# Patient Record
Sex: MALE | Race: BLACK OR AFRICAN AMERICAN | NOT HISPANIC OR LATINO
[De-identification: names, ages, dates, MRNs, and addresses within clinical notes are randomized per-mention and may not be internally consistent; named-entity substitution may affect disease eponyms.]

---

## 2017-01-27 ENCOUNTER — APPOINTMENT (OUTPATIENT)
Dept: INTERNAL MEDICINE | Facility: CLINIC | Age: 62
End: 2017-01-27

## 2017-01-27 VITALS — OXYGEN SATURATION: 98 % | TEMPERATURE: 97.3 F | WEIGHT: 146 LBS | BODY MASS INDEX: 23.57 KG/M2 | HEART RATE: 68 BPM

## 2017-02-12 ENCOUNTER — FORM ENCOUNTER (OUTPATIENT)
Age: 62
End: 2017-02-12

## 2017-02-13 ENCOUNTER — OUTPATIENT (OUTPATIENT)
Dept: OUTPATIENT SERVICES | Facility: HOSPITAL | Age: 62
LOS: 1 days | End: 2017-02-13
Payer: COMMERCIAL

## 2017-02-13 ENCOUNTER — APPOINTMENT (OUTPATIENT)
Dept: ORTHOPEDIC SURGERY | Facility: CLINIC | Age: 62
End: 2017-02-13

## 2017-02-13 VITALS
DIASTOLIC BLOOD PRESSURE: 70 MMHG | SYSTOLIC BLOOD PRESSURE: 118 MMHG | WEIGHT: 150 LBS | BODY MASS INDEX: 24.11 KG/M2 | HEIGHT: 66 IN

## 2017-02-13 PROCEDURE — 73564 X-RAY EXAM KNEE 4 OR MORE: CPT | Mod: 26,50

## 2017-02-13 PROCEDURE — 72170 X-RAY EXAM OF PELVIS: CPT

## 2017-02-13 PROCEDURE — 73564 X-RAY EXAM KNEE 4 OR MORE: CPT

## 2017-02-13 PROCEDURE — 72170 X-RAY EXAM OF PELVIS: CPT | Mod: 26

## 2017-06-26 ENCOUNTER — MEDICATION RENEWAL (OUTPATIENT)
Age: 62
End: 2017-06-26

## 2017-09-11 ENCOUNTER — MEDICATION RENEWAL (OUTPATIENT)
Age: 62
End: 2017-09-11

## 2017-11-09 ENCOUNTER — RX RENEWAL (OUTPATIENT)
Age: 62
End: 2017-11-09

## 2017-11-17 ENCOUNTER — APPOINTMENT (OUTPATIENT)
Dept: INTERNAL MEDICINE | Facility: CLINIC | Age: 62
End: 2017-11-17
Payer: COMMERCIAL

## 2017-11-17 VITALS
SYSTOLIC BLOOD PRESSURE: 104 MMHG | BODY MASS INDEX: 24.47 KG/M2 | OXYGEN SATURATION: 98 % | HEIGHT: 66 IN | WEIGHT: 152.25 LBS | DIASTOLIC BLOOD PRESSURE: 70 MMHG | HEART RATE: 72 BPM | TEMPERATURE: 97.7 F

## 2017-11-17 DIAGNOSIS — Z78.9 OTHER SPECIFIED HEALTH STATUS: ICD-10-CM

## 2017-11-17 DIAGNOSIS — Z87.448 PERSONAL HISTORY OF OTHER DISEASES OF URINARY SYSTEM: ICD-10-CM

## 2017-11-17 DIAGNOSIS — Z80.3 FAMILY HISTORY OF MALIGNANT NEOPLASM OF BREAST: ICD-10-CM

## 2017-11-17 DIAGNOSIS — M79.603 PAIN IN ARM, UNSPECIFIED: ICD-10-CM

## 2017-11-17 PROCEDURE — 99396 PREV VISIT EST AGE 40-64: CPT | Mod: 25

## 2017-11-17 PROCEDURE — 36415 COLL VENOUS BLD VENIPUNCTURE: CPT

## 2017-11-17 RX ORDER — IBUPROFEN 600 MG/1
600 TABLET, FILM COATED ORAL
Qty: 20 | Refills: 0 | Status: DISCONTINUED | COMMUNITY
Start: 2017-06-30

## 2017-11-17 RX ORDER — AMOXICILLIN 500 MG/1
500 CAPSULE ORAL
Qty: 21 | Refills: 0 | Status: DISCONTINUED | COMMUNITY
Start: 2017-06-30

## 2017-11-17 RX ORDER — SIMVASTATIN 10 MG/1
10 TABLET, FILM COATED ORAL
Qty: 90 | Refills: 0 | Status: DISCONTINUED | COMMUNITY
Start: 2017-06-26

## 2017-12-01 LAB
ALBUMIN SERPL ELPH-MCNC: 4.1 G/DL
ALP BLD-CCNC: 65 U/L
ALT SERPL-CCNC: 23 U/L
ANION GAP SERPL CALC-SCNC: 14 MMOL/L
AST SERPL-CCNC: 19 U/L
BASOPHILS # BLD AUTO: 0.03 K/UL
BASOPHILS NFR BLD AUTO: 0.4 %
BILIRUB SERPL-MCNC: 0.7 MG/DL
BUN SERPL-MCNC: 17 MG/DL
CALCIUM SERPL-MCNC: 9.6 MG/DL
CHLORIDE SERPL-SCNC: 103 MMOL/L
CHOLEST SERPL-MCNC: 192 MG/DL
CHOLEST/HDLC SERPL: 2.3 RATIO
CO2 SERPL-SCNC: 23 MMOL/L
CREAT SERPL-MCNC: 1.14 MG/DL
EOSINOPHIL # BLD AUTO: 0.21 K/UL
EOSINOPHIL NFR BLD AUTO: 2.9 %
GLUCOSE SERPL-MCNC: 91 MG/DL
HCT VFR BLD CALC: 42.8 %
HDLC SERPL-MCNC: 83 MG/DL
HGB BLD-MCNC: 14.6 G/DL
IMM GRANULOCYTES NFR BLD AUTO: 0.4 %
LDLC SERPL CALC-MCNC: 99 MG/DL
LYMPHOCYTES # BLD AUTO: 1.7 K/UL
LYMPHOCYTES NFR BLD AUTO: 23.4 %
MAN DIFF?: NORMAL
MCHC RBC-ENTMCNC: 32.3 PG
MCHC RBC-ENTMCNC: 34.1 GM/DL
MCV RBC AUTO: 94.7 FL
MONOCYTES # BLD AUTO: 0.55 K/UL
MONOCYTES NFR BLD AUTO: 7.6 %
NEUTROPHILS # BLD AUTO: 4.75 K/UL
NEUTROPHILS NFR BLD AUTO: 65.3 %
PLATELET # BLD AUTO: 277 K/UL
POTASSIUM SERPL-SCNC: 4.4 MMOL/L
PROT SERPL-MCNC: 7.1 G/DL
PSA SERPL-MCNC: 0.05 NG/ML
RBC # BLD: 4.52 M/UL
RBC # FLD: 14.9 %
SODIUM SERPL-SCNC: 140 MMOL/L
TRIGL SERPL-MCNC: 49 MG/DL
WBC # FLD AUTO: 7.27 K/UL

## 2018-01-17 ENCOUNTER — MEDICATION RENEWAL (OUTPATIENT)
Age: 63
End: 2018-01-17

## 2018-02-06 ENCOUNTER — APPOINTMENT (OUTPATIENT)
Dept: GASTROENTEROLOGY | Facility: CLINIC | Age: 63
End: 2018-02-06
Payer: COMMERCIAL

## 2018-02-06 VITALS
WEIGHT: 153 LBS | TEMPERATURE: 98.3 F | OXYGEN SATURATION: 97 % | HEART RATE: 60 BPM | SYSTOLIC BLOOD PRESSURE: 105 MMHG | DIASTOLIC BLOOD PRESSURE: 80 MMHG | RESPIRATION RATE: 14 BRPM | BODY MASS INDEX: 24.7 KG/M2

## 2018-02-06 DIAGNOSIS — Z12.11 ENCOUNTER FOR SCREENING FOR MALIGNANT NEOPLASM OF COLON: ICD-10-CM

## 2018-02-06 PROCEDURE — 99203 OFFICE O/P NEW LOW 30 MIN: CPT

## 2018-03-23 ENCOUNTER — APPOINTMENT (OUTPATIENT)
Dept: GASTROENTEROLOGY | Facility: CLINIC | Age: 63
End: 2018-03-23
Payer: COMMERCIAL

## 2018-03-23 PROCEDURE — 45378 DIAGNOSTIC COLONOSCOPY: CPT | Mod: 52

## 2018-04-12 ENCOUNTER — FORM ENCOUNTER (OUTPATIENT)
Age: 63
End: 2018-04-12

## 2018-04-13 ENCOUNTER — OUTPATIENT (OUTPATIENT)
Dept: OUTPATIENT SERVICES | Facility: HOSPITAL | Age: 63
LOS: 1 days | End: 2018-04-13
Payer: COMMERCIAL

## 2018-04-13 ENCOUNTER — APPOINTMENT (OUTPATIENT)
Dept: INTERNAL MEDICINE | Facility: CLINIC | Age: 63
End: 2018-04-13
Payer: COMMERCIAL

## 2018-04-13 VITALS
BODY MASS INDEX: 24.11 KG/M2 | WEIGHT: 150 LBS | OXYGEN SATURATION: 97 % | TEMPERATURE: 97.5 F | HEIGHT: 66 IN | HEART RATE: 69 BPM | DIASTOLIC BLOOD PRESSURE: 71 MMHG | SYSTOLIC BLOOD PRESSURE: 122 MMHG

## 2018-04-13 PROCEDURE — 73120 X-RAY EXAM OF HAND: CPT | Mod: 26,RT

## 2018-04-13 PROCEDURE — 73120 X-RAY EXAM OF HAND: CPT

## 2018-04-13 PROCEDURE — 99214 OFFICE O/P EST MOD 30 MIN: CPT

## 2018-04-27 ENCOUNTER — MOBILE ON CALL (OUTPATIENT)
Age: 63
End: 2018-04-27

## 2018-05-11 ENCOUNTER — APPOINTMENT (OUTPATIENT)
Dept: INTERNAL MEDICINE | Facility: CLINIC | Age: 63
End: 2018-05-11
Payer: COMMERCIAL

## 2018-05-11 VITALS
DIASTOLIC BLOOD PRESSURE: 70 MMHG | SYSTOLIC BLOOD PRESSURE: 119 MMHG | BODY MASS INDEX: 24.27 KG/M2 | OXYGEN SATURATION: 98 % | HEIGHT: 66 IN | TEMPERATURE: 97.6 F | HEART RATE: 58 BPM | WEIGHT: 151 LBS

## 2018-05-11 PROCEDURE — 99213 OFFICE O/P EST LOW 20 MIN: CPT

## 2018-05-31 ENCOUNTER — RX RENEWAL (OUTPATIENT)
Age: 63
End: 2018-05-31

## 2018-08-17 ENCOUNTER — APPOINTMENT (OUTPATIENT)
Dept: INTERNAL MEDICINE | Facility: CLINIC | Age: 63
End: 2018-08-17
Payer: COMMERCIAL

## 2018-08-17 VITALS
SYSTOLIC BLOOD PRESSURE: 126 MMHG | HEART RATE: 73 BPM | HEIGHT: 66 IN | OXYGEN SATURATION: 97 % | TEMPERATURE: 97.9 F | BODY MASS INDEX: 23.3 KG/M2 | DIASTOLIC BLOOD PRESSURE: 68 MMHG | WEIGHT: 145 LBS

## 2018-08-17 PROCEDURE — 99214 OFFICE O/P EST MOD 30 MIN: CPT

## 2018-08-17 NOTE — ASSESSMENT
[FreeTextEntry1] : Patient is a 63-year-old man with AVM status post intracranial bleed, status post coiling complicated by DVT, status post Coumadin, prostate cancer, hyperlipidemia, osteoarthritis, who presents for implant of right hip pain and fatigue for the past few months.\par \par #1 right hip pain.\par Mild tendinitis ice and rest nonsteroidals.\par Range of motion exercises observe.\par \par #2 chronic fatigue.\par Check TFTs, LFTs, CBC\par Discuss these hygiene.\par Observe consider sleep study in the future.\par \par #3 hyperlipidemic\par  simvastatin 40 mg once

## 2018-08-17 NOTE — HISTORY OF PRESENT ILLNESS
[FreeTextEntry1] : Complaint of right hip pain and fatigue [de-identified] : \par Patient is a 63-year-old man with history of AVM status post coiling complicated by DVT, status post Coumadin, history of prostate cancer, lipoma, osteoarthritis, who presents for complaint of right hip pain for the past few weeks, mainly in the morning, getting up seems to improve throughout the day. Denies redness, swelling, also complains of chronic fatigue at work fell asleep several times. Doesn't know if she snores however goes to sleep between 12:30 1:00 wakes up 6:30 on refreshed. He denies chest pain, shortness of breath, lightheadedness, dizziness\par

## 2018-08-17 NOTE — PHYSICAL EXAM
[No Acute Distress] : no acute distress [Well Nourished] : well nourished [Well Developed] : well developed [Normal Voice/Communication] : normal voice/communication [No Respiratory Distress] : no respiratory distress  [Clear to Auscultation] : lungs were clear to auscultation bilaterally [No Accessory Muscle Use] : no accessory muscle use [Normal Rate] : normal rate  [Regular Rhythm] : with a regular rhythm [Normal S1, S2] : normal S1 and S2 [Soft] : abdomen soft [Non Tender] : non-tender [Non-distended] : non-distended [No HSM] : no HSM [Normal Bowel Sounds] : normal bowel sounds [Normal Supraclavicular Nodes] : no supraclavicular lymphadenopathy [Normal Axillary Nodes] : no axillary lymphadenopathy [Normal Anterior Cervical Nodes] : no anterior cervical lymphadenopathy [No Joint Swelling] : no joint swelling [Grossly Normal Strength/Tone] : grossly normal strength/tone [de-identified] : right Hip full range of motion, negative erythema negative clinic

## 2018-08-23 ENCOUNTER — MOBILE ON CALL (OUTPATIENT)
Age: 63
End: 2018-08-23

## 2018-08-31 LAB
ALT SERPL-CCNC: 25 U/L
ANION GAP SERPL CALC-SCNC: 12 MMOL/L
BASOPHILS # BLD AUTO: 0.02 K/UL
BASOPHILS NFR BLD AUTO: 0.3 %
BUN SERPL-MCNC: 16 MG/DL
CALCIUM SERPL-MCNC: 9.9 MG/DL
CHLORIDE SERPL-SCNC: 103 MMOL/L
CO2 SERPL-SCNC: 25 MMOL/L
CREAT SERPL-MCNC: 0.89 MG/DL
EOSINOPHIL # BLD AUTO: 0.16 K/UL
EOSINOPHIL NFR BLD AUTO: 2.2 %
GLUCOSE SERPL-MCNC: 91 MG/DL
HCT VFR BLD CALC: 43.5 %
HGB BLD-MCNC: 14.7 G/DL
IMM GRANULOCYTES NFR BLD AUTO: 0.3 %
LYMPHOCYTES # BLD AUTO: 1.44 K/UL
LYMPHOCYTES NFR BLD AUTO: 19.5 %
MAN DIFF?: NORMAL
MCHC RBC-ENTMCNC: 32 PG
MCHC RBC-ENTMCNC: 33.8 GM/DL
MCV RBC AUTO: 94.6 FL
MONOCYTES # BLD AUTO: 0.57 K/UL
MONOCYTES NFR BLD AUTO: 7.7 %
NEUTROPHILS # BLD AUTO: 5.17 K/UL
NEUTROPHILS NFR BLD AUTO: 70 %
PLATELET # BLD AUTO: 283 K/UL
POTASSIUM SERPL-SCNC: 4.4 MMOL/L
RBC # BLD: 4.6 M/UL
RBC # FLD: 14.3 %
SODIUM SERPL-SCNC: 140 MMOL/L
TSH SERPL-ACNC: 1.02 UIU/ML
WBC # FLD AUTO: 7.38 K/UL

## 2018-11-13 ENCOUNTER — RX RENEWAL (OUTPATIENT)
Age: 63
End: 2018-11-13

## 2019-01-18 ENCOUNTER — APPOINTMENT (OUTPATIENT)
Dept: INTERNAL MEDICINE | Facility: CLINIC | Age: 64
End: 2019-01-18
Payer: COMMERCIAL

## 2019-01-18 VITALS
OXYGEN SATURATION: 98 % | WEIGHT: 144 LBS | TEMPERATURE: 97.3 F | HEART RATE: 75 BPM | HEIGHT: 66 IN | SYSTOLIC BLOOD PRESSURE: 114 MMHG | DIASTOLIC BLOOD PRESSURE: 72 MMHG | BODY MASS INDEX: 23.14 KG/M2

## 2019-01-18 PROCEDURE — 93000 ELECTROCARDIOGRAM COMPLETE: CPT

## 2019-01-18 PROCEDURE — 99212 OFFICE O/P EST SF 10 MIN: CPT | Mod: 25

## 2019-01-18 PROCEDURE — G0009: CPT

## 2019-01-18 PROCEDURE — 90670 PCV13 VACCINE IM: CPT

## 2019-01-18 PROCEDURE — G0439: CPT

## 2019-01-18 PROCEDURE — 36415 COLL VENOUS BLD VENIPUNCTURE: CPT

## 2019-03-13 ENCOUNTER — APPOINTMENT (OUTPATIENT)
Dept: INTERNAL MEDICINE | Facility: CLINIC | Age: 64
End: 2019-03-13
Payer: COMMERCIAL

## 2019-03-13 PROCEDURE — 36415 COLL VENOUS BLD VENIPUNCTURE: CPT

## 2019-03-15 LAB
ALBUMIN SERPL ELPH-MCNC: 4.1 G/DL
ALP BLD-CCNC: 60 U/L
ALT SERPL-CCNC: 21 U/L
ANION GAP SERPL CALC-SCNC: 12 MMOL/L
AST SERPL-CCNC: 20 U/L
BASOPHILS # BLD AUTO: 0.04 K/UL
BASOPHILS NFR BLD AUTO: 0.7 %
BILIRUB SERPL-MCNC: 0.7 MG/DL
BUN SERPL-MCNC: 15 MG/DL
CALCIUM SERPL-MCNC: 9.9 MG/DL
CHLORIDE SERPL-SCNC: 104 MMOL/L
CHOLEST SERPL-MCNC: 174 MG/DL
CHOLEST/HDLC SERPL: 2.3 RATIO
CO2 SERPL-SCNC: 28 MMOL/L
CREAT SERPL-MCNC: 1.15 MG/DL
EOSINOPHIL # BLD AUTO: 0.16 K/UL
EOSINOPHIL NFR BLD AUTO: 2.6 %
GLUCOSE SERPL-MCNC: 80 MG/DL
HCT VFR BLD CALC: 44.6 %
HDLC SERPL-MCNC: 76 MG/DL
HGB BLD-MCNC: 14.8 G/DL
IMM GRANULOCYTES NFR BLD AUTO: 0.3 %
LDLC SERPL CALC-MCNC: 88 MG/DL
LYMPHOCYTES # BLD AUTO: 1.25 K/UL
LYMPHOCYTES NFR BLD AUTO: 20.4 %
MAN DIFF?: NORMAL
MCHC RBC-ENTMCNC: 31.7 PG
MCHC RBC-ENTMCNC: 33.2 GM/DL
MCV RBC AUTO: 95.5 FL
MONOCYTES # BLD AUTO: 0.56 K/UL
MONOCYTES NFR BLD AUTO: 9.1 %
NEUTROPHILS # BLD AUTO: 4.1 K/UL
NEUTROPHILS NFR BLD AUTO: 66.9 %
PLATELET # BLD AUTO: 263 K/UL
POTASSIUM SERPL-SCNC: 4.4 MMOL/L
PROT SERPL-MCNC: 6.9 G/DL
PSA SERPL-MCNC: 0.04 NG/ML
RBC # BLD: 4.67 M/UL
RBC # FLD: 14.3 %
SODIUM SERPL-SCNC: 144 MMOL/L
TRIGL SERPL-MCNC: 49 MG/DL
WBC # FLD AUTO: 6.13 K/UL

## 2019-06-21 ENCOUNTER — MEDICATION RENEWAL (OUTPATIENT)
Age: 64
End: 2019-06-21

## 2019-06-21 ENCOUNTER — APPOINTMENT (OUTPATIENT)
Dept: INTERNAL MEDICINE | Facility: CLINIC | Age: 64
End: 2019-06-21
Payer: COMMERCIAL

## 2019-06-21 ENCOUNTER — OTHER (OUTPATIENT)
Age: 64
End: 2019-06-21

## 2019-06-21 VITALS
OXYGEN SATURATION: 98 % | HEIGHT: 66 IN | HEART RATE: 71 BPM | TEMPERATURE: 97.4 F | SYSTOLIC BLOOD PRESSURE: 110 MMHG | BODY MASS INDEX: 22.18 KG/M2 | DIASTOLIC BLOOD PRESSURE: 70 MMHG | WEIGHT: 138 LBS

## 2019-06-21 PROCEDURE — 99214 OFFICE O/P EST MOD 30 MIN: CPT | Mod: 25

## 2019-06-21 PROCEDURE — 36415 COLL VENOUS BLD VENIPUNCTURE: CPT

## 2019-06-21 NOTE — PHYSICAL EXAM
[No Acute Distress] : no acute distress [Well Developed] : well developed [Well-Appearing] : well-appearing [Well Nourished] : well nourished [No JVD] : no jugular venous distention [Normal Voice/Communication] : normal voice/communication [No Lymphadenopathy] : no lymphadenopathy [Supple] : supple [Clear to Auscultation] : lungs were clear to auscultation bilaterally [Thyroid Normal, No Nodules] : the thyroid was normal and there were no nodules present [No Respiratory Distress] : no respiratory distress  [No Accessory Muscle Use] : no accessory muscle use [Normal Rate] : normal rate  [Regular Rhythm] : with a regular rhythm [Soft] : abdomen soft [Non Tender] : non-tender [Non-distended] : non-distended [Normal Bowel Sounds] : normal bowel sounds [No HSM] : no HSM

## 2019-06-21 NOTE — HEALTH RISK ASSESSMENT
[Very Good] : ~his/her~  mood as very good [0] : 2) Feeling down, depressed, or hopeless: Not at all (0) [None] : None [Employed] : employed [College] : College [Single] : single [Fully functional (bathing, dressing, toileting, transferring, walking, feeding)] : Fully functional (bathing, dressing, toileting, transferring, walking, feeding) [Fully functional (using the telephone, shopping, preparing meals, housekeeping, doing laundry, using] : Fully functional and needs no help or supervision to perform IADLs (using the telephone, shopping, preparing meals, housekeeping, doing laundry, using transportation, managing medications and managing finances) [Smoke Detector] : smoke detector [Carbon Monoxide Detector] : carbon monoxide detector [Safety elements used in home] : safety elements used in home [Seat Belt] :  uses seat belt [Sunscreen] : uses sunscreen [] : No [Change in mental status noted] : No change in mental status noted [Language] : denies difficulty with language [Behavior] : denies difficulty with behavior [Learning/Retaining New Information] : denies difficulty learning/retaining new information [Handling Complex Tasks] : denies difficulty handling complex tasks [Reasoning] : denies difficulty with reasoning [Spatial Ability and Orientation] : denies difficulty with spatial ability and orientation [Reports changes in hearing] : Reports no changes in hearing [Reports changes in vision] : Reports no changes in vision [Reports changes in dental health] : Reports no changes in dental health [Guns at Home] : no guns at home [Travel to Developing Areas] : does not  travel to developing areas [TB Exposure] : is not being exposed to tuberculosis [Caregiver Concerns] : does not have caregiver concerns [ColonoscopyDate] : 2018

## 2019-06-21 NOTE — PHYSICAL EXAM
[No Acute Distress] : no acute distress [Well Nourished] : well nourished [Well Developed] : well developed [Well-Appearing] : well-appearing [Normal Voice/Communication] : normal voice/communication [Normal Sclera/Conjunctiva] : normal sclera/conjunctiva [PERRL] : pupils equal round and reactive to light [Normal Outer Ear/Nose] : the outer ears and nose were normal in appearance [Normal Oropharynx] : the oropharynx was normal [Normal TMs] : both tympanic membranes were normal [Normal Nasal Mucosa] : the nasal mucosa was normal [No JVD] : no jugular venous distention [Supple] : supple [No Lymphadenopathy] : no lymphadenopathy [Thyroid Normal, No Nodules] : the thyroid was normal and there were no nodules present [No Respiratory Distress] : no respiratory distress  [Clear to Auscultation] : lungs were clear to auscultation bilaterally [No Accessory Muscle Use] : no accessory muscle use [Normal Percussion] : the chest was normal to percussion [Normal Rate] : normal rate  [Regular Rhythm] : with a regular rhythm [Normal S1, S2] : normal S1 and S2 [No Murmur] : no murmur heard [No Carotid Bruits] : no carotid bruits [No Abdominal Bruit] : a ~M bruit was not heard ~T in the abdomen [No Varicosities] : no varicosities [Pedal Pulses Present] : the pedal pulses are present [No Edema] : there was no peripheral edema [No Extremity Clubbing/Cyanosis] : no extremity clubbing/cyanosis [No Palpable Aorta] : no palpable aorta [Normal Appearance] : normal in appearance [No Nipple Discharge] : no nipple discharge [No Axillary Lymphadenopathy] : no axillary lymphadenopathy [Soft] : abdomen soft [Non Tender] : non-tender [Non-distended] : non-distended [No Masses] : no abdominal mass palpated [No HSM] : no HSM [Normal Bowel Sounds] : normal bowel sounds [No Hernias] : no hernias [Normal Sphincter Tone] : normal sphincter tone [No Mass] : no mass [Normal Supraclavicular Nodes] : no supraclavicular lymphadenopathy [Normal Axillary Nodes] : no axillary lymphadenopathy [Normal Posterior Cervical Nodes] : no posterior cervical lymphadenopathy [Normal Femoral Nodes] : no femoral lymphadenopathy [Normal Anterior Cervical Nodes] : no anterior cervical lymphadenopathy [Normal Inguinal Nodes] : no inguinal lymphadenopathy [No CVA Tenderness] : no CVA  tenderness [No Spinal Tenderness] : no spinal tenderness [No Joint Swelling] : no joint swelling [Grossly Normal Strength/Tone] : grossly normal strength/tone [No Rash] : no rash [No Skin Lesions] : no skin lesions [Normal Gait] : normal gait [Coordination Grossly Intact] : coordination grossly intact [Deep Tendon Reflexes (DTR)] : deep tendon reflexes were 2+ and symmetric [Speech Grossly Normal] : speech grossly normal [Memory Grossly Normal] : memory grossly normal [Normal Affect] : the affect was normal [Alert and Oriented x3] : oriented to person, place, and time [Normal Mood] : the mood was normal [Normal Insight/Judgement] : insight and judgment were intact [Stool Occult Blood] : stool negative for occult blood

## 2019-06-21 NOTE — ASSESSMENT
[FreeTextEntry1] : \par \par Patient is a 63-year-old male with history of CVA status post AVM status post coiling complicated by DVT status post Coumadin, hyperlipidemia, osteoarthritis who presents for comprehensive annual physical examination and follow-up of hyperlipidemia prostate cancer and recent fall\par \par 1.  Fall\par Right shoulder pain\par Mild tendinitis versus mild rotator cuff tear\par Nonsteroidals exercise range of motion observe\par \par 2.  Hyperlipidemia\par Continue simvastatin check lipid profile LFTs\par \par 3.  History of prostate cancer\par Continue tamsulosin and oxybutynin follow-up with urology\par Check PSA\par \par 4.  Health maintenance\par Prevnar today\par Colonoscopy done\par Flu shot patient received\par Check routine labs\par Follow-up in 4 months

## 2019-06-21 NOTE — ASSESSMENT
[FreeTextEntry1] : Patient is a 64-year-old male with history of AVM status post coiling procedure, hyperlipidemia, prostate cancer, osteoarthritis, chronic fatigue who presents for follow-up of chronic fatigue and lipoma\par \par 1.  Chronic fatigue\par Several month history of falling asleep easily\par Will check routine labs and schedule sleep study\par \par 2.  Lipoma\par Discussed options for removal\par Patient will consider\par \par 3.  Hyperlipidemia\par Continue simvastatin 40 mg\par Counseled on diet\par Follow-up in 4 to 6 weeks

## 2019-06-21 NOTE — HISTORY OF PRESENT ILLNESS
[FreeTextEntry1] : \par \par Comprehensive annual physical examination follow-up for prostate cancer and hyperlipidemia [de-identified] : \par The patient is a 63-year-old male with history of AVM status post coiling complicated by DVT status post anticoagulation, hyperlipidemia, history of prostate cancer status post radiation, lipoma, osteoarthritis who presents for comprehensive annual physical examination and follow-up for hyperlipidemia history of prostate cancer.  Patient feels well had an episode of fall traumatized back right shoulder some pain denies palpitation, lightheadedness, dizziness, nausea vomiting, head trauma.\par \par

## 2019-06-21 NOTE — HISTORY OF PRESENT ILLNESS
[FreeTextEntry1] : Follow-up for patient presents for complaint of fatigue for the past 2 months and possible removal of posterior neck lipoma [de-identified] : Patient is a 64-year-old male with history of intracranial bleed status post coiling complicated by DVT off Coumadin doing well, prostate cancer status post treatment, lipoma on the posterior neck, urinary frequency on oxybutynin and Flomax who presents for follow-up patient complains of fatigue past few months notes snoring denies headache, dizziness, lightheadedness, palpitations also complains of neck problems with lipoma

## 2019-06-26 LAB
ALBUMIN SERPL ELPH-MCNC: 4.4 G/DL
ALP BLD-CCNC: 69 U/L
ALT SERPL-CCNC: 18 U/L
ANION GAP SERPL CALC-SCNC: 11 MMOL/L
AST SERPL-CCNC: 19 U/L
BASOPHILS # BLD AUTO: 0.04 K/UL
BASOPHILS NFR BLD AUTO: 0.6 %
BILIRUB SERPL-MCNC: 0.5 MG/DL
BUN SERPL-MCNC: 16 MG/DL
CALCIUM SERPL-MCNC: 9.7 MG/DL
CHLORIDE SERPL-SCNC: 103 MMOL/L
CO2 SERPL-SCNC: 25 MMOL/L
CREAT SERPL-MCNC: 1 MG/DL
EOSINOPHIL # BLD AUTO: 0.14 K/UL
EOSINOPHIL NFR BLD AUTO: 2.2 %
GLUCOSE SERPL-MCNC: 90 MG/DL
HCT VFR BLD CALC: 42.3 %
HGB BLD-MCNC: 14.2 G/DL
IMM GRANULOCYTES NFR BLD AUTO: 0.3 %
LYMPHOCYTES # BLD AUTO: 1.3 K/UL
LYMPHOCYTES NFR BLD AUTO: 20.4 %
MAN DIFF?: NORMAL
MCHC RBC-ENTMCNC: 31.6 PG
MCHC RBC-ENTMCNC: 33.6 GM/DL
MCV RBC AUTO: 94.2 FL
MONOCYTES # BLD AUTO: 0.61 K/UL
MONOCYTES NFR BLD AUTO: 9.6 %
NEUTROPHILS # BLD AUTO: 4.25 K/UL
NEUTROPHILS NFR BLD AUTO: 66.9 %
PLATELET # BLD AUTO: 276 K/UL
POTASSIUM SERPL-SCNC: 4.5 MMOL/L
PROT SERPL-MCNC: 7 G/DL
RBC # BLD: 4.49 M/UL
RBC # FLD: 14 %
SODIUM SERPL-SCNC: 139 MMOL/L
TSH SERPL-ACNC: 1.18 UIU/ML
WBC # FLD AUTO: 6.36 K/UL

## 2019-06-28 ENCOUNTER — OTHER (OUTPATIENT)
Age: 64
End: 2019-06-28

## 2019-07-01 ENCOUNTER — APPOINTMENT (OUTPATIENT)
Dept: PULMONOLOGY | Facility: CLINIC | Age: 64
End: 2019-07-01
Payer: COMMERCIAL

## 2019-07-01 VITALS
TEMPERATURE: 98.7 F | WEIGHT: 138 LBS | BODY MASS INDEX: 22.18 KG/M2 | DIASTOLIC BLOOD PRESSURE: 70 MMHG | HEART RATE: 60 BPM | OXYGEN SATURATION: 97 % | HEIGHT: 66 IN | SYSTOLIC BLOOD PRESSURE: 111 MMHG

## 2019-07-01 DIAGNOSIS — R06.83 SNORING: ICD-10-CM

## 2019-07-01 PROCEDURE — 99204 OFFICE O/P NEW MOD 45 MIN: CPT

## 2019-07-01 NOTE — ASSESSMENT
[FreeTextEntry1] : 63yo man with hx of AVM s/p post coiling complicated by DVT, HLD, prostate CA s/p RT, OA here for evaluation of sleep apnea. Referred by Dr. Guzman Prado. The patient has multiple signs and symptoms of sleep-disordered breathing including unrefreshed sleep, excessive daytime somnolence, and snoring. He was referred to the [facility] for a diagnostic PSG. If the diagnostic PSG is not approved by insurance then HSAT is an acceptable options. The ramifications of DEBI and its potential therapeutic modalities were discussed with the patient. We also discussed increasing his total sleep time as he may be sleep deprived since he is regularly getting under 6 hours of sleep. The patient was cautioned on the importance of avoiding drowsy driving. He will follow up with us after the PSG.\par \par \par \par

## 2019-07-01 NOTE — HISTORY OF PRESENT ILLNESS
[FreeTextEntry1] : 65yo man with hx of AVM s/p post coiling complicated by DVT, HLD, prostate CA s/p RT, OA here for evaluation of sleep apnea. Has been very sleepy for about 1 year. Snores, not sure about apneas. He is feeling unrefreshed in the AM. No weight changes. Works 3PM to 10PM for most of the week; TST is about 5 to 6 hours. 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 No RLS. No history of TBI or concussions. [Snoring] : snoring [Frequent Nocturnal Awakening] : frequent nocturnal awakening [Daytime Somnolence] : daytime somnolence [ESS: ___] : ESS score [unfilled] [Unintentional Sleep While Inactive] : unintentional sleep while inactive [Awakes Unrefreshed] : awakening unrefreshed [Awakes with Headache] : no headache upon awakening [Awakening With Dry Mouth] : no dry mouth upon awakening [Recent  Weight Gain] : no recent weight gain [DIS] : no DIS [DMS] : no DMS [Unusual Sleep Behavior] : no unusual sleep behavior

## 2019-07-01 NOTE — CONSULT LETTER
[Dear  ___] : Dear  [unfilled], [Consult Letter:] : I had the pleasure of evaluating your patient, [unfilled]. [Please see my note below.] : Please see my note below. [Consult Closing:] : Thank you very much for allowing me to participate in the care of this patient.  If you have any questions, please do not hesitate to contact me. [Sincerely,] : Sincerely, [FreeTextEntry3] : Tracie Gan MD\par \par [employer] [facility]\par Pulmonary, Critical Care, and Sleep Medicine\par

## 2019-07-01 NOTE — REVIEW OF SYSTEMS
[EDS: ESS=____] : daytime somnolence: ESS=[unfilled] [Difficulty Initiating Sleep] : no difficulty falling asleep [Lower Extremity Discomfort] : no lower extremity discomfort [Late day/ Evening symptoms] : no late day/evening symptoms [Unusual Sleep Behavior] : no unusual sleep behavior [Hypersomnolence] : not sleeping much more than usual [Cataplexy] :  no cataplexy [Negative] : Psychiatric

## 2019-07-01 NOTE — PHYSICAL EXAM
[General Appearance - Well Developed] : well developed [General Appearance - Well Nourished] : well nourished [Normal Conjunctiva] : the conjunctiva exhibited no abnormalities [Enlarged Base of the Tongue] : enlargement of the base of the tongue [IV] : IV [Neck Appearance] : the appearance of the neck was normal [Apical Impulse] : the apical impulse was normal [Heart Sounds] : normal S1 and S2 [] : no respiratory distress [Respiration, Rhythm And Depth] : normal respiratory rhythm and effort [Exaggerated Use Of Accessory Muscles For Inspiration] : no accessory muscle use [Auscultation Breath Sounds / Voice Sounds] : lungs were clear to auscultation bilaterally [Abnormal Walk] : normal gait [Involuntary Movements] : no involuntary movements were seen [Skin Color & Pigmentation] : normal skin color and pigmentation [No Focal Deficits] : no focal deficits [Oriented To Time, Place, And Person] : oriented to person, place, and time [Impaired Insight] : insight and judgment were intact [Affect] : the affect was normal [Mood] : the mood was normal

## 2019-07-19 ENCOUNTER — OTHER (OUTPATIENT)
Age: 64
End: 2019-07-19

## 2019-08-16 ENCOUNTER — OTHER (OUTPATIENT)
Age: 64
End: 2019-08-16

## 2019-09-23 ENCOUNTER — OTHER (OUTPATIENT)
Age: 64
End: 2019-09-23

## 2019-11-19 ENCOUNTER — OTHER (OUTPATIENT)
Age: 64
End: 2019-11-19

## 2019-12-02 ENCOUNTER — OTHER (OUTPATIENT)
Age: 64
End: 2019-12-02

## 2019-12-06 ENCOUNTER — APPOINTMENT (OUTPATIENT)
Dept: INTERNAL MEDICINE | Facility: CLINIC | Age: 64
End: 2019-12-06
Payer: MEDICARE

## 2019-12-06 VITALS
WEIGHT: 138 LBS | SYSTOLIC BLOOD PRESSURE: 126 MMHG | DIASTOLIC BLOOD PRESSURE: 67 MMHG | OXYGEN SATURATION: 98 % | HEIGHT: 66 IN | BODY MASS INDEX: 22.18 KG/M2 | HEART RATE: 68 BPM | TEMPERATURE: 97.4 F

## 2019-12-06 DIAGNOSIS — Z23 ENCOUNTER FOR IMMUNIZATION: ICD-10-CM

## 2019-12-06 PROCEDURE — 99214 OFFICE O/P EST MOD 30 MIN: CPT | Mod: 25

## 2019-12-06 PROCEDURE — G0008: CPT

## 2019-12-06 PROCEDURE — 90662 IIV NO PRSV INCREASED AG IM: CPT

## 2019-12-06 NOTE — REVIEW OF SYSTEMS
[Joint Pain] : joint pain [Joint Stiffness] : no joint stiffness [Muscle Pain] : no muscle pain [Muscle Weakness] : no muscle weakness [Back Pain] : no back pain [Joint Swelling] : no joint swelling [Negative] : Respiratory

## 2019-12-06 NOTE — ASSESSMENT
[FreeTextEntry1] : Is a healthy 64-year-old male with history of CVA secondary to AVM status post coiling complicated by DVT status post anticoagulation, lipoma or neck, hyperlipidemia osteoarthritis who presents with right shoulder pain right wrist pain\par \par 1 right shoulder pain\par probable overuse tendinitis\par ice and Aleve\par \par 2 right wrist pain\par use computer mouse\par overuse syndrome ice rest and Aleve\par \par 3 hyperlipidemia\par continue simvastatin 40 mg\par next visit check lipid profile\par \par 4 health maintenance\par flu shot today\par CPE in one month

## 2019-12-06 NOTE — PHYSICAL EXAM
[Normal] : normal gait, coordination grossly intact, no focal deficits and deep tendon reflexes were 2+ and symmetric [de-identified] : Full range of motion right shoulder minimal tenderness over anterior shoulder negative tinel and phalen

## 2019-12-06 NOTE — HISTORY OF PRESENT ILLNESS
[FreeTextEntry8] : \par \par CC: right shoulder pain and right wrist pain for the past few weeks\par \par HPI the patient is a 66-year-old male with history of prostate cancer, history of intracranial hemorrhage secondary to AVM status post, coiling, complicated by DVT status post anticoagulation hyperlipidemia, lipoma on neck osteoarthritis who presents for several week history of right shoulder pain and right wrist pain pain constant but mild 1 to 2/10 denies weakness, rash, joint swelling worse with overuse

## 2019-12-26 ENCOUNTER — FORM ENCOUNTER (OUTPATIENT)
Age: 64
End: 2019-12-26

## 2019-12-27 ENCOUNTER — OUTPATIENT (OUTPATIENT)
Dept: OUTPATIENT SERVICES | Facility: HOSPITAL | Age: 64
LOS: 1 days | End: 2019-12-27
Payer: MEDICARE

## 2019-12-27 PROCEDURE — 73030 X-RAY EXAM OF SHOULDER: CPT

## 2019-12-27 PROCEDURE — 73030 X-RAY EXAM OF SHOULDER: CPT | Mod: 26,RT

## 2020-01-22 ENCOUNTER — RX RENEWAL (OUTPATIENT)
Age: 65
End: 2020-01-22

## 2020-01-24 ENCOUNTER — NON-APPOINTMENT (OUTPATIENT)
Age: 65
End: 2020-01-24

## 2020-01-24 ENCOUNTER — APPOINTMENT (OUTPATIENT)
Dept: INTERNAL MEDICINE | Facility: CLINIC | Age: 65
End: 2020-01-24
Payer: MEDICARE

## 2020-01-24 ENCOUNTER — LABORATORY RESULT (OUTPATIENT)
Age: 65
End: 2020-01-24

## 2020-01-24 VITALS
TEMPERATURE: 97.3 F | WEIGHT: 132 LBS | HEIGHT: 66 IN | DIASTOLIC BLOOD PRESSURE: 70 MMHG | OXYGEN SATURATION: 97 % | SYSTOLIC BLOOD PRESSURE: 116 MMHG | HEART RATE: 62 BPM | BODY MASS INDEX: 21.21 KG/M2

## 2020-01-24 PROCEDURE — 36415 COLL VENOUS BLD VENIPUNCTURE: CPT

## 2020-01-24 PROCEDURE — 99212 OFFICE O/P EST SF 10 MIN: CPT | Mod: 25

## 2020-01-24 PROCEDURE — G0442 ANNUAL ALCOHOL SCREEN 15 MIN: CPT | Mod: 59

## 2020-01-24 PROCEDURE — 93000 ELECTROCARDIOGRAM COMPLETE: CPT | Mod: 59

## 2020-01-24 PROCEDURE — G0438: CPT

## 2020-01-24 NOTE — PHYSICAL EXAM
[No Carotid Bruits] : no carotid bruits [Pedal Pulses Present] : the pedal pulses are present [No Palpable Aorta] : no palpable aorta [Normal Appearance] : normal in appearance [No Extremity Clubbing/Cyanosis] : no extremity clubbing/cyanosis [No Axillary Lymphadenopathy] : no axillary lymphadenopathy [No Nipple Discharge] : no nipple discharge [No Masses] : no palpable masses [Normal Sphincter Tone] : normal sphincter tone [No Mass] : no mass [Scrotum] : the scrotum was normal [Penis Abnormality] : normal circumcised penis [Prostate Enlargement] : the prostate was not enlarged [Testes Mass (___cm)] : there were no testicular masses [Normal] : normal gait, coordination grossly intact, no focal deficits and deep tendon reflexes were 2+ and symmetric [Stool Occult Blood] : stool negative for occult blood [de-identified] : Trace edema spider veins tinea pedis [de-identified] : 5 cm lipoma posterior neck left side [de-identified] : small area of hypopigmentation on legs

## 2020-01-24 NOTE — REASON FOR VISIT
[Annual Wellness Visit] : an annual wellness visit [FreeTextEntry1] : Comprehensive annual physical examination follow-up for prostate cancer and hyperlipidemia

## 2020-01-24 NOTE — HEALTH RISK ASSESSMENT
[Very Good] : ~his/her~  mood as very good [No] : No [No falls in past year] : Patient reported no falls in the past year [0] : 2) Feeling down, depressed, or hopeless: Not at all (0) [Patient reported colonoscopy was abnormal] : Patient reported colonoscopy was abnormal [HIV Test offered] : HIV Test offered [Hepatitis C test declined] : Hepatitis C test declined [None] : None [Retired] : retired [# of Members in Household ___] :  household currently consist of [unfilled] member(s) [Alone] : lives alone [College] : College [Single] : single [Feels Safe at Home] : Feels safe at home [Sexually Active] : sexually active [Fully functional (bathing, dressing, toileting, transferring, walking, feeding)] : Fully functional (bathing, dressing, toileting, transferring, walking, feeding) [Fully functional (using the telephone, shopping, preparing meals, housekeeping, doing laundry, using] : Fully functional and needs no help or supervision to perform IADLs (using the telephone, shopping, preparing meals, housekeeping, doing laundry, using transportation, managing medications and managing finances) [Reports normal functional visual acuity (ie: able to read med bottle)] : Reports normal functional visual acuity [Smoke Detector] : smoke detector [Carbon Monoxide Detector] : carbon monoxide detector [Seat Belt] :  uses seat belt [Safety elements used in home] : safety elements used in home [Sunscreen] : uses sunscreen [[id number]] : 0 [Change in mental status noted] : No change in mental status noted [Language] : denies difficulty with language [Behavior] : denies difficulty with behavior [Handling Complex Tasks] : denies difficulty handling complex tasks [Reasoning] : denies difficulty with reasoning [High Risk Behavior] : no high risk behavior [Reports changes in vision] : Reports no changes in vision [Reports changes in hearing] : Reports no changes in hearing [Reports changes in dental health] : Reports no changes in dental health [Guns at Home] : no guns at home [Travel to Developing Areas] : does not  travel to developing areas [Caregiver Concerns] : does not have caregiver concerns [TB Exposure] : is not being exposed to tuberculosis [ColonoscopyDate] : 3/18

## 2020-01-24 NOTE — HISTORY OF PRESENT ILLNESS
[FreeTextEntry1] : Comprehensive annual physical examination follow-up for shoulder pain/hyperlipidemia and prostate cancer [de-identified] : Patient is a 64-year-old -American male with history of small hemorrhagic stroke sex secondary to AVM status post coiling complicated by DVT status post anticoagulation, prostate cancer status post radiation, lipoma, hyperlipidemia, osteoarthritis, who presents for comprehensive annual physical examination patient feels well denies chest pain, shortness of breath, distant exertion palpitation notes improvement in shoulder pain.

## 2020-01-24 NOTE — PLAN
[FreeTextEntry1] : he patient is a 64-year-old -American male with history of hemorrhagic stroke secondary to AVM status post coiling complicated by DVT status post anticoagulation, prostate cancer status post radiation, hyperlipidemia, osteoarthritis and lipoma who presents for comprehensive annual physical examination follow-up of high cholesterol osteoarthritis and prostate cancer\par \par 1 prostate cancer\par status post radiation\par check PSA follow-up with urology continue Flomax 0.4 Q day\par \par 2 status post hemorrhagic stroke with no residual deficit\par status post coiling asymptomatic\par \par 3 osteoarthritis\par x-rays negative shoulder pain has resolved\par observe\par \par 4 sinus bradycardia\par EKG shows sinus bradycardia 53 check TSH electrolytes\par observe asymptomatic\par \par 5 hyperlipidemia\par continue simvastatin 40 check lipid profile LFTs\par \par 6 health maintenance\par colonoscopy 2008 poor prep check fecal cards\par immunizations up-to-date\par check routine labs\par follow-up in 4 to 6 months\par new sexual partner check HIV GC chlamydia syphilis.\par \par 7. Dermatology\par the July ago and mohit Blackman\par use topical cream for two weeks over-the-counter antifungal if vitiligo worsens call back for Derm referral

## 2020-01-24 NOTE — ADDENDUM
[FreeTextEntry1] : The patient is a 64-year-old -American male with history of hemorrhagic stroke secondary to AVM status post coiling complicated by DVT status post anticoagulation, prostate cancer status post radiation, hyperlipidemia, osteoarthritis and lipoma who presents for comprehensive annual physical examination follow-up of high cholesterol osteoarthritis and prostate cancer\par \par 1 prostate cancer\par status post radiation\par check PSA follow-up with urology continue Flomax 0.4 Q day\par \par 2 status post hemorrhagic stroke with no residual deficit\par status post coiling asymptomatic\par \par 3 osteoarthritis\par x-rays negative shoulder pain has resolved\par observe\par \par 4 sinus bradycardia\par EKG shows sinus bradycardia 53 check TSH electrolytes\par observe asymptomatic\par \par 5 hyperlipidemia\par continue simvastatin 40 check lipid profile LFTs\par \par 6 health maintenance\par colonoscopy 2008 poor prep check fecal cards\par immunizations up-to-date\par check routine labs\par follow-up in 4 to 6 months\par new sexual partner check HIV GC chlamydia syphilis.

## 2020-01-28 LAB
ALBUMIN SERPL ELPH-MCNC: 4.3 G/DL
ALP BLD-CCNC: 58 U/L
ALT SERPL-CCNC: 26 U/L
ANION GAP SERPL CALC-SCNC: 10 MMOL/L
AST SERPL-CCNC: 23 U/L
BASOPHILS # BLD AUTO: 0.04 K/UL
BASOPHILS NFR BLD AUTO: 0.7 %
BILIRUB SERPL-MCNC: 0.8 MG/DL
BUN SERPL-MCNC: 15 MG/DL
C TRACH RRNA SPEC QL NAA+PROBE: NOT DETECTED
CALCIUM SERPL-MCNC: 9.1 MG/DL
CHLORIDE SERPL-SCNC: 103 MMOL/L
CHOLEST SERPL-MCNC: 173 MG/DL
CHOLEST/HDLC SERPL: 2.2 RATIO
CO2 SERPL-SCNC: 28 MMOL/L
CREAT SERPL-MCNC: 1.06 MG/DL
EOSINOPHIL # BLD AUTO: 0.21 K/UL
EOSINOPHIL NFR BLD AUTO: 3.7 %
GLUCOSE SERPL-MCNC: 92 MG/DL
HCT VFR BLD CALC: 42 %
HDLC SERPL-MCNC: 79 MG/DL
HGB BLD-MCNC: 14.3 G/DL
IMM GRANULOCYTES NFR BLD AUTO: 0.2 %
LDLC SERPL CALC-MCNC: 83 MG/DL
LYMPHOCYTES # BLD AUTO: 1.15 K/UL
LYMPHOCYTES NFR BLD AUTO: 20 %
MAN DIFF?: NORMAL
MCHC RBC-ENTMCNC: 32.9 PG
MCHC RBC-ENTMCNC: 34 GM/DL
MCV RBC AUTO: 96.6 FL
MONOCYTES # BLD AUTO: 0.55 K/UL
MONOCYTES NFR BLD AUTO: 9.6 %
N GONORRHOEA RRNA SPEC QL NAA+PROBE: NOT DETECTED
NEUTROPHILS # BLD AUTO: 3.79 K/UL
NEUTROPHILS NFR BLD AUTO: 65.8 %
PLATELET # BLD AUTO: 222 K/UL
POTASSIUM SERPL-SCNC: 4.2 MMOL/L
PROT SERPL-MCNC: 6.4 G/DL
PSA SERPL-MCNC: 0.04 NG/ML
RBC # BLD: 4.35 M/UL
RBC # FLD: 13.9 %
SODIUM SERPL-SCNC: 141 MMOL/L
SOURCE AMPLIFICATION: NORMAL
T PALLIDUM AB SER QL IA: NEGATIVE
TRIGL SERPL-MCNC: 53 MG/DL
TSH SERPL-ACNC: 0.97 UIU/ML
WBC # FLD AUTO: 5.75 K/UL

## 2020-02-04 LAB — HEMOCCULT STL QL IA: NEGATIVE

## 2020-02-14 ENCOUNTER — APPOINTMENT (OUTPATIENT)
Dept: INTERNAL MEDICINE | Facility: CLINIC | Age: 65
End: 2020-02-14
Payer: MEDICARE

## 2020-02-14 VITALS
HEIGHT: 66 IN | OXYGEN SATURATION: 97 % | DIASTOLIC BLOOD PRESSURE: 82 MMHG | WEIGHT: 139 LBS | BODY MASS INDEX: 22.34 KG/M2 | SYSTOLIC BLOOD PRESSURE: 118 MMHG | TEMPERATURE: 97.2 F | HEART RATE: 67 BPM

## 2020-02-14 PROCEDURE — 99213 OFFICE O/P EST LOW 20 MIN: CPT

## 2020-02-14 NOTE — PHYSICAL EXAM
[Normal] : the outer ears and nose were normal in appearance and the oropharynx was normal [Urinary Bladder Findings] : the bladder was normal on palpation [Penis Abnormality] : normal uncircumcised penis [Testes Tenderness] : no tenderness of the testes [Normal Supraclavicular Nodes] : no supraclavicular lymphadenopathy [Normal Posterior Cervical Nodes] : no posterior cervical lymphadenopathy [Normal Axillary Nodes] : no axillary lymphadenopathy [Normal Inguinal Nodes] : no inguinal lymphadenopathy [Normal Anterior Cervical Nodes] : no anterior cervical lymphadenopathy [FreeTextEntry1] : No rash is no discharge

## 2020-02-14 NOTE — ASSESSMENT
[FreeTextEntry1] : Patient is a 64-year-old man with history of CVA status post coiling complicated by DVT, hyperlipidemia, prostate cancer, osteoarthritis who presents for exposure to chlamydia\par \par 1 exposure chlamydia\par low risk will check GC chlamydia\par will empirically treat Zithromax 1 g tabs one dose\par follow-up.

## 2020-02-14 NOTE — HISTORY OF PRESENT ILLNESS
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 Patient states girlfriend tested positive for GC has been sexually active with her intermittently for the past two years usually uses condoms no oral sex. Patient denies fever, chills, rash, discharge.

## 2020-02-18 LAB
C TRACH RRNA SPEC QL NAA+PROBE: NOT DETECTED
N GONORRHOEA RRNA SPEC QL NAA+PROBE: NOT DETECTED
SOURCE AMPLIFICATION: NORMAL

## 2020-06-11 ENCOUNTER — RX RENEWAL (OUTPATIENT)
Age: 65
End: 2020-06-11

## 2020-11-25 ENCOUNTER — RX RENEWAL (OUTPATIENT)
Age: 65
End: 2020-11-25

## 2020-12-01 ENCOUNTER — EMERGENCY (EMERGENCY)
Facility: HOSPITAL | Age: 65
LOS: 1 days | Discharge: HOME | End: 2020-12-01
Admitting: EMERGENCY MEDICINE
Payer: MEDICARE

## 2020-12-01 VITALS
OXYGEN SATURATION: 98 % | DIASTOLIC BLOOD PRESSURE: 78 MMHG | RESPIRATION RATE: 18 BRPM | TEMPERATURE: 98 F | HEIGHT: 66 IN | HEART RATE: 70 BPM | SYSTOLIC BLOOD PRESSURE: 129 MMHG

## 2020-12-01 PROCEDURE — 99283 EMERGENCY DEPT VISIT LOW MDM: CPT
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
## 2020-12-01 NOTE — ED PROVIDER NOTE - CLINICAL SUMMARY MEDICAL DECISION MAKING FREE TEXT BOX
Patient requesting testing for COVID-19. On exam, Patient appears well and in no apparent distress. Nasopharyngeal PCR has been obtained, and Patient has been guided on how to obtain their results.  General COVID-19 discharge instructions have been given to the Patient. Patient agrees to receiving results electronically.

## 2020-12-01 NOTE — ED PROVIDER NOTE - PATIENT PORTAL LINK FT
You can access the FollowMyHealth Patient Portal offered by [facility] by registering at the following website: [email or web address]. By joining [employer]’s FollowMyHealth portal, you will also be able to view your health information using other applications (apps) compatible with our system.

## 2020-12-01 NOTE — ED PROVIDER NOTE - OBJECTIVE STATEMENT
66 y/o male presents to the ED requesting COVID-19 testing. Patient is currently asymptomatic and has no other medical complaints at this time. Denies fever, chills, chest pain, SOB, cough.

## 2020-12-01 NOTE — ED PROVIDER NOTE - PSH
Bilateral Cataract Surgery  2009  Bilateral Corneal Transplant  2009  Insertion  Radioactive Prostate Seeds  ' 2007  S/P Insertion of IVC (Inferior Vena Caval) Filter  1/24/11  S/P Tonsillectomy  childhood

## 2020-12-02 LAB — SARS-COV-2 RNA SPEC QL NAA+PROBE: SIGNIFICANT CHANGE UP

## 2020-12-05 DIAGNOSIS — Z20.828 CONTACT WITH AND (SUSPECTED) EXPOSURE TO OTHER VIRAL COMMUNICABLE DISEASES: ICD-10-CM

## 2021-03-05 ENCOUNTER — NON-APPOINTMENT (OUTPATIENT)
Age: 66
End: 2021-03-05

## 2021-03-05 ENCOUNTER — APPOINTMENT (OUTPATIENT)
Dept: INTERNAL MEDICINE | Facility: CLINIC | Age: 66
End: 2021-03-05
Payer: MEDICARE

## 2021-03-05 VITALS
SYSTOLIC BLOOD PRESSURE: 119 MMHG | OXYGEN SATURATION: 99 % | HEIGHT: 66 IN | HEART RATE: 77 BPM | WEIGHT: 140 LBS | DIASTOLIC BLOOD PRESSURE: 75 MMHG | TEMPERATURE: 98 F | BODY MASS INDEX: 22.5 KG/M2

## 2021-03-05 VITALS — SYSTOLIC BLOOD PRESSURE: 100 MMHG | DIASTOLIC BLOOD PRESSURE: 64 MMHG

## 2021-03-05 DIAGNOSIS — Z87.898 PERSONAL HISTORY OF OTHER SPECIFIED CONDITIONS: ICD-10-CM

## 2021-03-05 DIAGNOSIS — Z20.2 CONTACT WITH AND (SUSPECTED) EXPOSURE TO INFECTIONS WITH A PREDOMINANTLY SEXUAL MODE OF TRANSMISSION: ICD-10-CM

## 2021-03-05 DIAGNOSIS — M25.511 PAIN IN RIGHT SHOULDER: ICD-10-CM

## 2021-03-05 DIAGNOSIS — M79.644 PAIN IN RIGHT FINGER(S): ICD-10-CM

## 2021-03-05 PROCEDURE — G0439: CPT

## 2021-03-05 PROCEDURE — 36415 COLL VENOUS BLD VENIPUNCTURE: CPT

## 2021-03-05 PROCEDURE — 99072 ADDL SUPL MATRL&STAF TM PHE: CPT

## 2021-03-05 NOTE — HISTORY OF PRESENT ILLNESS
[FreeTextEntry1] : Comprehensive annual physical examination [de-identified] : Patient is a 66-year-old male with history of small hemorrhagic stroke secondary to AVMs status post coiling procedure complicated by DVT status post anticoagulation with no recurrence, history of prostate cancer status post seed radiation, lipoma, hyperlipidemia, and osteoarthritis who presents for comprehensive annual physical examination. Patient feels well denies chest pain, shortness of breath palpitation lightheadedness dizziness headache nausea vomiting feels well

## 2021-03-05 NOTE — HEALTH RISK ASSESSMENT
[Very Good] : ~his/her~  mood as very good [No] : No [No falls in past year] : Patient reported no falls in the past year [0] : 2) Feeling down, depressed, or hopeless: Not at all (0) [Patient reported colonoscopy was normal] : Patient reported colonoscopy was normal [HIV test declined] : HIV test declined [Hepatitis C test declined] : Hepatitis C test declined [None] : None [Alone] : lives alone [Retired] : retired [College] : College [Single] : single [# Of Children ___] : has [unfilled] children [Feels Safe at Home] : Feels safe at home [Fully functional (bathing, dressing, toileting, transferring, walking, feeding)] : Fully functional (bathing, dressing, toileting, transferring, walking, feeding) [Fully functional (using the telephone, shopping, preparing meals, housekeeping, doing laundry, using] : Fully functional and needs no help or supervision to perform IADLs (using the telephone, shopping, preparing meals, housekeeping, doing laundry, using transportation, managing medications and managing finances) [Reports normal functional visual acuity (ie: able to read med bottle)] : Reports normal functional visual acuity [Smoke Detector] : smoke detector [Carbon Monoxide Detector] : carbon monoxide detector [Safety elements used in home] : safety elements used in home [Seat Belt] :  uses seat belt [Sunscreen] : uses sunscreen [] : No [Audit-CScore] : 0 [de-identified] : Walks [de-identified] : Healthy [[id number]] : 0 [Change in mental status noted] : No change in mental status noted [Language] : denies difficulty with language [Behavior] : denies difficulty with behavior [Learning/Retaining New Information] : denies difficulty learning/retaining new information [Handling Complex Tasks] : denies difficulty handling complex tasks [Reasoning] : denies difficulty with reasoning [Spatial Ability and Orientation] : denies difficulty with spatial ability and orientation [Sexually Active] : not sexually active [High Risk Behavior] : no high risk behavior [Reports changes in hearing] : Reports no changes in hearing [Reports changes in vision] : Reports no changes in vision [Reports changes in dental health] : Reports no changes in dental health [Guns at Home] : no guns at home [Travel to Developing Areas] : does not  travel to developing areas [TB Exposure] : is not being exposed to tuberculosis [Caregiver Concerns] : does not have caregiver concerns [ColonoscopyDate] : 2018

## 2021-03-05 NOTE — ASSESSMENT
[FreeTextEntry1] : Patient is a 66-year-old male with history of small hemorrhage status post coiling procedure, complicated by DVT, prostate cancer, lipoma, hyperlipidemia, and osteoarthritis who presents for comprehensive annual physical examination.\par \par 1 prostate cancer\par status proceeds\par last PSA 0.604 follow-up with urology\par check PSA\par \par 2 hyperlipidemia\par continue simvastatin 20 mg last LDL cholesterol was 83 at golf. HDL 79\par check lipid profile\par \par 3. History of CVA/client procedure\par no evidence of residual defect\par \par 4 history of lipoma neck\par doesn't bother patient\par \par \par 5 osteoarthritis\par uses Tylenol as needed\par \par 6 health maintenance\par discussed living will and or healthcare proxy patient to give name of person to contact next visit\par referral for colonoscopy three years ago non-diagnostic for prep\par patient received first maternal vaccine had flu shot\par check routine labs including PSA\par follow-up in 4 to 6 months.\par Next visit consider pneumococcal vaccine and shingrax\par
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
## 2021-03-12 LAB
ALBUMIN SERPL ELPH-MCNC: 4.3 G/DL
ALP BLD-CCNC: 69 U/L
ALT SERPL-CCNC: 18 U/L
ANION GAP SERPL CALC-SCNC: 11 MMOL/L
AST SERPL-CCNC: 24 U/L
BASOPHILS # BLD AUTO: 0.04 K/UL
BASOPHILS NFR BLD AUTO: 0.4 %
BILIRUB SERPL-MCNC: 0.7 MG/DL
BUN SERPL-MCNC: 15 MG/DL
CALCIUM SERPL-MCNC: 9.1 MG/DL
CHLORIDE SERPL-SCNC: 105 MMOL/L
CHOLEST SERPL-MCNC: 167 MG/DL
CO2 SERPL-SCNC: 26 MMOL/L
CREAT SERPL-MCNC: 1.19 MG/DL
EOSINOPHIL # BLD AUTO: 0.14 K/UL
EOSINOPHIL NFR BLD AUTO: 1.6 %
GLUCOSE SERPL-MCNC: 86 MG/DL
HCT VFR BLD CALC: 41.3 %
HDLC SERPL-MCNC: 76 MG/DL
HGB BLD-MCNC: 14.1 G/DL
IMM GRANULOCYTES NFR BLD AUTO: 0.3 %
LDLC SERPL CALC-MCNC: 83 MG/DL
LYMPHOCYTES # BLD AUTO: 1.01 K/UL
LYMPHOCYTES NFR BLD AUTO: 11.2 %
MAN DIFF?: NORMAL
MCHC RBC-ENTMCNC: 32.7 PG
MCHC RBC-ENTMCNC: 34.1 GM/DL
MCV RBC AUTO: 95.8 FL
MONOCYTES # BLD AUTO: 0.8 K/UL
MONOCYTES NFR BLD AUTO: 8.9 %
NEUTROPHILS # BLD AUTO: 6.99 K/UL
NEUTROPHILS NFR BLD AUTO: 77.6 %
NONHDLC SERPL-MCNC: 91 MG/DL
PLATELET # BLD AUTO: 229 K/UL
POTASSIUM SERPL-SCNC: 4.1 MMOL/L
PROT SERPL-MCNC: 6.8 G/DL
PSA SERPL-MCNC: 0.04 NG/ML
RBC # BLD: 4.31 M/UL
RBC # FLD: 13.8 %
SODIUM SERPL-SCNC: 142 MMOL/L
TRIGL SERPL-MCNC: 39 MG/DL
WBC # FLD AUTO: 9.01 K/UL

## 2021-03-12 RX ORDER — AZITHROMYCIN 500 MG/1
500 TABLET, FILM COATED ORAL
Qty: 2 | Refills: 0 | Status: DISCONTINUED | COMMUNITY
Start: 2020-02-14 | End: 2021-03-12

## 2021-04-23 ENCOUNTER — APPOINTMENT (OUTPATIENT)
Dept: INTERNAL MEDICINE | Facility: CLINIC | Age: 66
End: 2021-04-23
Payer: MEDICARE

## 2021-04-23 VITALS
HEART RATE: 64 BPM | WEIGHT: 142 LBS | HEIGHT: 66 IN | SYSTOLIC BLOOD PRESSURE: 115 MMHG | BODY MASS INDEX: 22.82 KG/M2 | TEMPERATURE: 97.7 F | DIASTOLIC BLOOD PRESSURE: 82 MMHG | OXYGEN SATURATION: 99 %

## 2021-04-23 DIAGNOSIS — Z92.29 PERSONAL HISTORY OF OTHER DRUG THERAPY: ICD-10-CM

## 2021-04-23 DIAGNOSIS — M25.551 PAIN IN RIGHT HIP: ICD-10-CM

## 2021-04-23 DIAGNOSIS — M25.531 PAIN IN RIGHT WRIST: ICD-10-CM

## 2021-04-23 PROCEDURE — 99072 ADDL SUPL MATRL&STAF TM PHE: CPT

## 2021-04-23 PROCEDURE — 99213 OFFICE O/P EST LOW 20 MIN: CPT

## 2021-07-09 ENCOUNTER — APPOINTMENT (OUTPATIENT)
Dept: INTERNAL MEDICINE | Facility: CLINIC | Age: 66
End: 2021-07-09
Payer: MEDICARE

## 2021-07-09 ENCOUNTER — TRANSCRIPTION ENCOUNTER (OUTPATIENT)
Age: 66
End: 2021-07-09

## 2021-07-09 VITALS — SYSTOLIC BLOOD PRESSURE: 108 MMHG | DIASTOLIC BLOOD PRESSURE: 58 MMHG

## 2021-07-09 VITALS
OXYGEN SATURATION: 97 % | TEMPERATURE: 96.7 F | HEART RATE: 60 BPM | SYSTOLIC BLOOD PRESSURE: 109 MMHG | WEIGHT: 153 LBS | HEIGHT: 66 IN | BODY MASS INDEX: 24.59 KG/M2 | DIASTOLIC BLOOD PRESSURE: 70 MMHG

## 2021-07-09 DIAGNOSIS — Q28.2 ARTERIOVENOUS MALFORMATION OF CEREBRAL VESSELS: ICD-10-CM

## 2021-07-09 PROBLEM — Z92.29 HISTORY OF PNEUMOCOCCAL VACCINATION: Status: RESOLVED | Noted: 2019-01-18 | Resolved: 2021-07-09

## 2021-07-09 PROBLEM — M25.531 RIGHT WRIST PAIN: Status: RESOLVED | Noted: 2019-12-06 | Resolved: 2021-07-09

## 2021-07-09 PROBLEM — M25.551 RIGHT HIP PAIN: Status: RESOLVED | Noted: 2018-08-17 | Resolved: 2021-07-09

## 2021-07-09 PROCEDURE — 99214 OFFICE O/P EST MOD 30 MIN: CPT

## 2021-07-09 NOTE — REVIEW OF SYSTEMS
[Mole Changes] : no mole changes [Nail Changes] : no nail changes [Hair Changes] : no hair changes [Skin Rash] : no skin rash

## 2021-07-09 NOTE — REVIEW OF SYSTEMS
[Muscle Pain] : muscle pain [Joint Pain] : no joint pain [Joint Stiffness] : no joint stiffness [Muscle Weakness] : no muscle weakness [Joint Swelling] : no joint swelling [Itching] : itching [Negative] : Psychiatric

## 2021-07-09 NOTE — ASSESSMENT
[FreeTextEntry1] : Patient is a 66-year-old male with history of intracranial bleed secondary to AVMs status post coiling complicated by DVT status post anticoagulation, prostate cancer, hyperlipidemia, osteoarthritis who presents for follow-up of right hand pain and rash on buttocks\par \par 1. Wrist tendinitis\par that is post splinting and Advil doing well pain resolved\par \par 2 bilateral rash\par looks fungal will start Lotrisone cream BID for two weeks\par follow-up in three\par \par 3. Hyperlipidemia\par continue simvastatin 40 mg a day\par LDL less than hundred\par \par 4. Prostate cancer\par continue tamsulosin follow-up with urology\par \par 5 history of AVMs status post coiling/DVT\par resolved.

## 2021-07-09 NOTE — ASSESSMENT
[FreeTextEntry1] : The patient is a 66-year-old male with history of CVA secondary to AVMs status post coiling complicated by DVT, hypercholesterolemia, lipoma, osteoarthritis, prostate cancer, who presents for complaint of right hand pain\par \par 1. Right hand pain\par most likely tendinitis\par wrist splint\par iced rest and Advil for five days if fails to improve will refer to ortho\par \par 2 health maintenance\par patient received both covert vaccine [place]

## 2021-07-12 RX ORDER — CLOTRIMAZOLE AND BETAMETHASONE DIPROPIONATE 10; .5 MG/G; MG/G
1-0.05 CREAM TOPICAL TWICE DAILY
Qty: 1 | Refills: 1 | Status: ACTIVE | COMMUNITY
Start: 2021-07-09 | End: 1900-01-01

## 2021-07-23 ENCOUNTER — APPOINTMENT (OUTPATIENT)
Dept: INTERNAL MEDICINE | Facility: CLINIC | Age: 66
End: 2021-07-23
Payer: MEDICARE

## 2021-07-23 VITALS
DIASTOLIC BLOOD PRESSURE: 56 MMHG | TEMPERATURE: 95.3 F | HEART RATE: 59 BPM | WEIGHT: 147 LBS | HEIGHT: 66 IN | OXYGEN SATURATION: 98 % | SYSTOLIC BLOOD PRESSURE: 97 MMHG | BODY MASS INDEX: 23.63 KG/M2

## 2021-07-23 PROCEDURE — 99213 OFFICE O/P EST LOW 20 MIN: CPT

## 2021-07-23 NOTE — HISTORY OF PRESENT ILLNESS
[FreeTextEntry1] : Follow-up for rash [de-identified] : The patient is a 66-year-old male with history of prostate cancer status post radiation, AVMs status post coiling coiling complicated by DVT status post anticoagulation, hyperlipidemia lipoma neck osteoarthritis who presents for follow-up of rash on buttocks. Patient using Lamisil cream with improvement denies itchy, redness, fever, chills.

## 2021-07-23 NOTE — ASSESSMENT
[FreeTextEntry1] : Patient is a 66-year-old male with history of AVMs status post coiling complicated by DVT resolved, hyperlipidemia, history of prostate cancer status post seed radiation who presents with follow-up for rash\par \par 1. Rash probable fungus\par markedly improved with antifungal cream clotrimazole\par continue for one more week then observe. If rash returns call back immediately.

## 2021-08-19 ENCOUNTER — APPOINTMENT (OUTPATIENT)
Dept: GASTROENTEROLOGY | Facility: CLINIC | Age: 66
End: 2021-08-19
Payer: MEDICARE

## 2021-08-19 VITALS
OXYGEN SATURATION: 99 % | BODY MASS INDEX: 24.43 KG/M2 | HEIGHT: 66 IN | RESPIRATION RATE: 15 BRPM | WEIGHT: 152 LBS | HEART RATE: 65 BPM | TEMPERATURE: 97.6 F | SYSTOLIC BLOOD PRESSURE: 110 MMHG | DIASTOLIC BLOOD PRESSURE: 65 MMHG

## 2021-08-19 PROCEDURE — 99204 OFFICE O/P NEW MOD 45 MIN: CPT

## 2021-08-20 NOTE — PHYSICAL EXAM
[General Appearance - Alert] : alert [General Appearance - In No Acute Distress] : in no acute distress [General Appearance - Well Nourished] : well nourished [Sclera] : the sclera and conjunctiva were normal [Outer Ear] : the ears and nose were normal in appearance [Neck Appearance] : the appearance of the neck was normal [Respiration, Rhythm And Depth] : normal respiratory rhythm and effort [Heart Rate And Rhythm] : heart rate was normal and rhythm regular [Edema] : there was no peripheral edema [Bowel Sounds] : normal bowel sounds [Abdomen Soft] : soft [Abdomen Tenderness] : non-tender [Abnormal Walk] : normal gait [Skin Turgor] : normal skin turgor [Skin Color & Pigmentation] : normal skin color and pigmentation [] : no rash [No Focal Deficits] : no focal deficits [Oriented To Time, Place, And Person] : oriented to person, place, and time [Impaired Insight] : insight and judgment were intact [Affect] : the affect was normal

## 2021-08-20 NOTE — HISTORY OF PRESENT ILLNESS
[de-identified] : 66M with history of prostate cancer status post radiation, AVMs status post coiling coiling complicated by DVT status post anticoagulation, hyperlipidemia lipoma neck osteoarthritis here for colon cancer screening. \par \par No GI complaints. No blood in stool. \par \par FHX: mother had bone cancer \par Etoh: none \par Smoking: none\par Drug use: none

## 2021-08-20 NOTE — ASSESSMENT
[FreeTextEntry1] : 66M with history of prostate cancer status post radiation, AVMs status post coiling coiling complicated by DVT status post anticoagulation, hyperlipidemia lipoma neck osteoarthritis here for colon cancer screening. \par \par Colon cancer screening\par - schedule patient for colonoscopy, r/a/i/b discussed and patient agreeable \par - bowel preparation to be provided \par \par f/u post procedure

## 2021-10-01 ENCOUNTER — RX RENEWAL (OUTPATIENT)
Age: 66
End: 2021-10-01

## 2021-10-29 ENCOUNTER — APPOINTMENT (OUTPATIENT)
Dept: INTERNAL MEDICINE | Facility: CLINIC | Age: 66
End: 2021-10-29
Payer: MEDICARE

## 2021-10-29 VITALS
BODY MASS INDEX: 25.55 KG/M2 | WEIGHT: 159 LBS | HEIGHT: 66 IN | TEMPERATURE: 96.3 F | OXYGEN SATURATION: 97 % | HEART RATE: 75 BPM | DIASTOLIC BLOOD PRESSURE: 68 MMHG | SYSTOLIC BLOOD PRESSURE: 116 MMHG

## 2021-10-29 PROCEDURE — 99213 OFFICE O/P EST LOW 20 MIN: CPT

## 2021-10-29 NOTE — REVIEW OF SYSTEMS
[Joint Pain] : joint pain [Muscle Pain] : muscle pain [Negative] : Integumentary [Joint Stiffness] : no joint stiffness [Muscle Weakness] : no muscle weakness [Back Pain] : no back pain [Joint Swelling] : no joint swelling

## 2021-10-29 NOTE — ASSESSMENT
[FreeTextEntry1] : The patient is a 66-year-old male with history of AVMs status post coiling complicated by DVT status post anticoagulation, hyperlipidemia, prostate cancer status post treatment, osteoarthritis, lipoma the neck who presents for one week history of right shoulder pain\par \par 1 right shoulder pain\par most likely secondary rotator cuff tendinitis\par Aleve two tablets BID\par range of motion stretching\par  if fails improve referral for physical therapy observe for now. Avoid exercising shoulder with weights\par \par 2. Health maintenance\par [facility]\par pending booster COVID-19.

## 2021-10-29 NOTE — PHYSICAL EXAM
[Normal] : normal gait, coordination grossly intact, no focal deficits and deep tendon reflexes were 2+ and symmetric [de-identified] : Right shoulder full range of motion with passive active motions no swelling minimal tenderness pulses intact sensory grossly intact

## 2021-10-29 NOTE — HISTORY OF PRESENT ILLNESS
[FreeTextEntry8] : CC: right shoulder pain for one week\par \par HPI: patient is a 66-year-old male with history of AVMs status post coiling complicated by DVT status post anticoagulation no recurrence, prostate cancer status post seed implants, hyperlipidemia erectile dysfunction osteoarthritis of the knees history of snoring who presents with one week history of right shoulder pain posterior with certain positions lasting several minutes denies weakness, redness, swelling. Otherwise feels well

## 2021-11-05 ENCOUNTER — APPOINTMENT (OUTPATIENT)
Age: 66
End: 2021-11-05
Payer: MEDICARE

## 2021-11-05 PROCEDURE — G0121 COLON CA SCRN NOT HI RSK IND: CPT

## 2021-11-08 RX ORDER — POLYETHYLENE GLYCOL 3350 AND ELECTROLYTES WITH LEMON FLAVOR 236; 22.74; 6.74; 5.86; 2.97 G/4L; G/4L; G/4L; G/4L; G/4L
236 POWDER, FOR SOLUTION ORAL
Qty: 1 | Refills: 0 | Status: ACTIVE | COMMUNITY
Start: 2021-11-08 | End: 1900-01-01

## 2022-02-04 ENCOUNTER — RESULT REVIEW (OUTPATIENT)
Age: 67
End: 2022-02-04

## 2022-02-04 ENCOUNTER — APPOINTMENT (OUTPATIENT)
Dept: INTERNAL MEDICINE | Facility: CLINIC | Age: 67
End: 2022-02-04
Payer: MEDICARE

## 2022-02-04 ENCOUNTER — OUTPATIENT (OUTPATIENT)
Dept: OUTPATIENT SERVICES | Facility: HOSPITAL | Age: 67
LOS: 1 days | End: 2022-02-04
Payer: COMMERCIAL

## 2022-02-04 VITALS
BODY MASS INDEX: 25.39 KG/M2 | HEIGHT: 66 IN | HEART RATE: 68 BPM | SYSTOLIC BLOOD PRESSURE: 104 MMHG | DIASTOLIC BLOOD PRESSURE: 62 MMHG | OXYGEN SATURATION: 97 % | TEMPERATURE: 97.4 F | WEIGHT: 158 LBS

## 2022-02-04 PROCEDURE — 73560 X-RAY EXAM OF KNEE 1 OR 2: CPT | Mod: 26,LT

## 2022-02-04 PROCEDURE — 73560 X-RAY EXAM OF KNEE 1 OR 2: CPT

## 2022-02-04 PROCEDURE — 99214 OFFICE O/P EST MOD 30 MIN: CPT

## 2022-02-11 ENCOUNTER — APPOINTMENT (OUTPATIENT)
Dept: ULTRASOUND IMAGING | Facility: HOSPITAL | Age: 67
End: 2022-02-11
Payer: MEDICARE

## 2022-02-11 ENCOUNTER — OUTPATIENT (OUTPATIENT)
Dept: OUTPATIENT SERVICES | Facility: HOSPITAL | Age: 67
LOS: 1 days | End: 2022-02-11
Payer: COMMERCIAL

## 2022-02-11 PROCEDURE — 93926 LOWER EXTREMITY STUDY: CPT

## 2022-02-11 PROCEDURE — 93926 LOWER EXTREMITY STUDY: CPT | Mod: 26,LT

## 2022-03-04 ENCOUNTER — APPOINTMENT (OUTPATIENT)
Dept: INTERNAL MEDICINE | Facility: CLINIC | Age: 67
End: 2022-03-04
Payer: MEDICARE

## 2022-03-04 VITALS
DIASTOLIC BLOOD PRESSURE: 74 MMHG | HEART RATE: 63 BPM | OXYGEN SATURATION: 97 % | BODY MASS INDEX: 25.39 KG/M2 | TEMPERATURE: 97.3 F | SYSTOLIC BLOOD PRESSURE: 133 MMHG | HEIGHT: 66 IN | WEIGHT: 158 LBS

## 2022-03-04 VITALS — SYSTOLIC BLOOD PRESSURE: 124 MMHG | DIASTOLIC BLOOD PRESSURE: 70 MMHG

## 2022-03-04 PROCEDURE — 99214 OFFICE O/P EST MOD 30 MIN: CPT

## 2022-03-04 NOTE — REVIEW OF SYSTEMS
[Joint Pain] : joint pain [Negative] : Integumentary [Joint Stiffness] : no joint stiffness [Muscle Pain] : no muscle pain [Muscle Weakness] : no muscle weakness [Back Pain] : no back pain [Joint Swelling] : no joint swelling

## 2022-03-04 NOTE — PHYSICAL EXAM
[Normal Sclera/Conjunctiva] : normal sclera/conjunctiva [Normal] : no CVA or spinal tenderness [de-identified] : Right knee full range of motion mild tenderness

## 2022-03-04 NOTE — ASSESSMENT
[FreeTextEntry1] : Patient is a 67-year-old male with history of prostate cancer status post radiation, AVMs status post coiling complicated by DVT, hyperlipidemia, lipoma on the neck and osteoarthritis who presents for follow-up of left knee pain, abnormal Grazyna'S  a and shoulder pain\par \par 1. Abnormal GRAZYNA's\par arterial Doppler showed mild plaque buildup no acute focal obstruction\par will be more aggressive with cholesterol\par aspirin 81 mg a day\par \par 2. Left knee pain\par most likely osteoarthritis\par trial of [facility]bi Loredo physical therapy\par if fails improve referral to orthopedics\par \par 3.. Right shoulder pain\par symptoms have improved will observe\par \par 4. Hyperlipidemia\par DC simvastatin start atorvastatin 20 mg\par follow-up in six weeks check lipid profile

## 2022-03-04 NOTE — ASSESSMENT
[FreeTextEntry1] : Patient is a 66-year-old male with history of prostate cancer, intracranial bleed secondary to AVMs status post coiling complicated by DVT/PE, osteoarthritis in knees, hyperlipidemia, who presents for complaint of left knee pain and incidentally noted by GRAZYNA by [employer] of 0.31\par \par 1. Abnormal ABIs\par check left leg Doppler's rule out peripheral vascular disease\par continue simvastatin\par \par 2. Knee pain\par most likely due to osteoarthritis in knees not peripheral vascular\par will check x-ray Tylenol or Advil for now PRN pain\par \par 3. Hyperlipidemia\par continue simvastatin 40 mg a day\par follow-up after above test.

## 2022-03-04 NOTE — PHYSICAL EXAM
[Normal Sclera/Conjunctiva] : normal sclera/conjunctiva [Normal Outer Ear/Nose] : the outer ears and nose were normal in appearance [No Carotid Bruits] : no carotid bruits [No Palpable Aorta] : no palpable aorta [No Extremity Clubbing/Cyanosis] : no extremity clubbing/cyanosis [Normal] : no rash [de-identified] : Bilateral trace edema pulses intact slightly cool feet

## 2022-03-04 NOTE — HISTORY OF PRESENT ILLNESS
[FreeTextEntry8] : \par \par CC: patient is a 66-year-old male with history of prostate cancer, history of intracranial bleed secondary to AVMs status post coiling procedure complicated by PE DVT lipoma the neck, hyperlipidemia, who presents for complaint of left knee pain. Patient notes for the past few months chronic left knee pain even at rest but recently had a GRAZYNA done by [employer] GRAZYNA of 0.31 left leg 1.2 right leg patient denies claudication symptoms. Denies chest pain, shortness of breath

## 2022-03-04 NOTE — HISTORY OF PRESENT ILLNESS
[FreeTextEntry1] : Follow-up for left knee pain, abnormal ABIs and right shoulder pain [de-identified] : The patient is a 67-year-old male with history of prostate cancer status post radiation, history of AVMs status post coiling complicated by DVT status post anticoagulation, hyperlipidemia, lipoma neck, osteoarthritis who presents for follow-up of left knee pain and abnormal harman'S. Patient note complains of 5/10 pain usually after walking but sometimes in the morning. Denies chest pain, shortness of breath polyuria polydipsia notes improvement in shoulder pain.

## 2022-04-22 ENCOUNTER — APPOINTMENT (OUTPATIENT)
Dept: INTERNAL MEDICINE | Facility: CLINIC | Age: 67
End: 2022-04-22
Payer: MEDICARE

## 2022-04-22 VITALS
TEMPERATURE: 96.6 F | DIASTOLIC BLOOD PRESSURE: 72 MMHG | SYSTOLIC BLOOD PRESSURE: 115 MMHG | WEIGHT: 158 LBS | HEART RATE: 70 BPM | HEIGHT: 66 IN | OXYGEN SATURATION: 97 % | BODY MASS INDEX: 25.39 KG/M2

## 2022-04-22 PROCEDURE — 36415 COLL VENOUS BLD VENIPUNCTURE: CPT

## 2022-04-22 PROCEDURE — 99214 OFFICE O/P EST MOD 30 MIN: CPT | Mod: 25

## 2022-04-22 NOTE — PHYSICAL EXAM
[No JVD] : no jugular venous distention [No Lymphadenopathy] : no lymphadenopathy [Supple] : supple [Thyroid Normal, No Nodules] : the thyroid was normal and there were no nodules present [Normal] : no CVA or spinal tenderness [de-identified] : Six [employer] lipoma on posterior neck [de-identified] : [employer] lipoma on posterior neck [de-identified] : Full range of motion no point tenderness right and left knee

## 2022-04-22 NOTE — REVIEW OF SYSTEMS
[Joint Pain] : joint pain [Back Pain] : back pain [Negative] : Genitourinary [Joint Stiffness] : no joint stiffness [Muscle Pain] : no muscle pain [Muscle Weakness] : no muscle weakness [Joint Swelling] : no joint swelling

## 2022-04-22 NOTE — ASSESSMENT
[FreeTextEntry1] : Patient is a 67-year-old male with history of AVMs status post coiling complicated by DVT resolved, hyperlipidemia, osteoarthritis of the knee, hyperlipidemia lipoma who presents for follow-up\par \par 1. Left knee pain/osteoarthritis\par x-ray shows medial compartment arthritis\par persistent pain in spite of physical therapy and meloxicam\par referral to orthopedics\par \par 2. Hyperlipidemia\par continue atorvastatin 20 mg check lipid profile\par \par 3 history of prostate cancer\par check PSA as per urology\par continue Flomax 0.4 milligrams\par \par 4. Lipoma posterior neck\par becoming a\par patient is thinking about having it removed will give referral to surgeon\par \par 5 health maintenance\par CPE in two months

## 2022-04-22 NOTE — HISTORY OF PRESENT ILLNESS
[FreeTextEntry1] : Follow-up for left knee pain prostate cancer hyperlipidemia and lipoma [de-identified] : The patient is a 67-year-old male with history of CVA secondary to AVMs status post coiling complicated by DVT resolved with no residual defect, large lipoma posterior neck, hyperlipidemia and osteoarthritis of medial compartment of left knee who presents for follow-up patient consistent complaints of persistent pain left greater than right knee denies swelling redness also complains of annoying lipoma on back of neck denies chest pain, shortness of breath distant exertion polyuria polydipsia

## 2022-04-26 ENCOUNTER — OUTPATIENT (OUTPATIENT)
Dept: OUTPATIENT SERVICES | Facility: HOSPITAL | Age: 67
LOS: 1 days | End: 2022-04-26
Payer: COMMERCIAL

## 2022-04-26 ENCOUNTER — RESULT REVIEW (OUTPATIENT)
Age: 67
End: 2022-04-26

## 2022-04-26 ENCOUNTER — APPOINTMENT (OUTPATIENT)
Dept: ORTHOPEDIC SURGERY | Facility: CLINIC | Age: 67
End: 2022-04-26
Payer: MEDICARE

## 2022-04-26 VITALS
TEMPERATURE: 97.6 F | HEART RATE: 63 BPM | WEIGHT: 158 LBS | BODY MASS INDEX: 25.39 KG/M2 | OXYGEN SATURATION: 93 % | HEIGHT: 66 IN | SYSTOLIC BLOOD PRESSURE: 117 MMHG | DIASTOLIC BLOOD PRESSURE: 69 MMHG

## 2022-04-26 LAB
CHOLEST SERPL-MCNC: 156 MG/DL
HDLC SERPL-MCNC: 65 MG/DL
LDLC SERPL CALC-MCNC: 80 MG/DL
NONHDLC SERPL-MCNC: 91 MG/DL
PSA SERPL-MCNC: 0.04 NG/ML
TRIGL SERPL-MCNC: 59 MG/DL

## 2022-04-26 PROCEDURE — 99204 OFFICE O/P NEW MOD 45 MIN: CPT | Mod: 25

## 2022-04-26 PROCEDURE — 73564 X-RAY EXAM KNEE 4 OR MORE: CPT | Mod: 26,50

## 2022-04-26 PROCEDURE — 20611 DRAIN/INJ JOINT/BURSA W/US: CPT | Mod: LT

## 2022-04-26 PROCEDURE — 73564 X-RAY EXAM KNEE 4 OR MORE: CPT

## 2022-04-26 NOTE — PHYSICAL EXAM
[de-identified] : General: Well-nourished, well-developed, alert, and in no acute distress.\par Head: Normocephalic.\par Eyes: Pupils equal round reactive to light and accommodation, extraocular muscles intact, normal sclera.\par Nose: No nasal discharge.\par Cardiac: Regular rate. Extremities are warm and well perfused. Distal pulses are symmetric bilaterally.\par Respiratory: No labored breathing.\par Extremities: Sensation is intact distally bilaterally.  Distal pulses are symmetric bilaterally\par Lymphatic: No regional lymphadenopathy, no lymphedema\par Neurologic: No focal deficits\par Skin: Normal skin color, texture, and turgor\par Psychiatric: Normal affect\par MSK: as noted above/below\par \par \par \par RIGHT KNEE:\par \par Inspection: no bruising, swelling, erythema\par Joint Effusion:no \par ROM: Knee Flexion 120 , Knee Extension 0\par Palpation:MEDIAL JOINT LINE PAIN, PERIPATELLAR PAIN, No pain at patellar tendon, MFC/LFC, Medial/Lateral Tibial Plateau\par Leg Length Discrepancy:no\par Patella: no apprehension\par Distal Pulses: normal\par Lower Extremity Strength:normal, 5/5 \par Lower Extremity Reflexes:normal, 2+\par Lower Extremity Sensation: normal\par \par Special Tests:\par Dexter:Negative \par Gareth: Negative\par Anterior Drawer:Negative\par Posterior Drawer:Negative \par Varus/Valgus:Negative, no instability\par \par LEFT KNEE:\par \par Inspection: no bruising, erythema\par Joint Effusion:MILD\par ROM: Knee Flexion 110 , Knee Extension 0\par Palpation:MEDIAL JOINT LINE PAIN, PERIPATELLAR PAIN, No pain at patellar tendon, MFC/LFC, Medial/Lateral Tibial Plateau\par Leg Length Discrepancy:no\par Patella: no apprehension\par Distal Pulses: normal\par Lower Extremity Strength:normal, 5/5 \par Lower Extremity Reflexes:normal, 2+\par Lower Extremity Sensation: normal\par \par Special Tests:\par Dexter:Negative \par Gareth: Negative\par Anterior Drawer:Negative\par Posterior Drawer:Negative \par Varus/Valgus:Negative, no instability\par \par \par  [de-identified] : Xray Bilateral Knees - Multiple views were reviewed with the patient in detail.  There is no acute fracture or dislocation.  There is LEFT joint effusion.  Left knee moderate knee OA with moderate-severe medial compartment narrowing. We will await formal radiology reading.\par \par

## 2022-04-26 NOTE — HISTORY OF PRESENT ILLNESS
[5] : a current pain level of 5/10 [de-identified] : The patient is a 67 year old man presenting with left knee pain.\par \par Patient has a known history of knee osteoarthritis.\par \par He presents with a several month history of chronic, atraumatic progressive left medial knee pain, stiffness, and swelling.  He initially saw his PMD and tried Meloxicam and PT with mild relief of symptoms.  The patient denies mechanical symptoms including catching, locking, buckling. He endorses morning stiffness.\par \par Pain is rated 5/10, described as dull, improved with heat, worse with walking.

## 2022-04-26 NOTE — PROCEDURE
[de-identified] : Ultrasound-Guided LEFT Knee Injection\par \par Indication for U/S Guidance: Ensure placement within the tibiofemoral joint for diagnostic purposes, while avoiding neurovascular structures\par \par Indication for Injection: KNEE OSTEOARTHRITIS\par \par A discussion was had with the patient regarding this procedure and all questions were answered. All risks, benefits and alternatives were discussed. These included but were not limited to bleeding, infection, injection site reaction/complication and allergic reaction. A timeout was done to ensure correct side and pt agreed to the procedure. Betadine was used to sterilize and prep the area, and alcohol was used to clean the skin in the anterior aspect of the knee joint. The suprapatellar space was visualized utilizing the Sonosite, linear transducer. The joint was visualized in the short axis and an in-plane approach was used for the injection. Ultrasound guidance was utilized to ensure accuracy of the intra-articular injection and avoid the neurovascular structures. A 22-gauge 1.5" needle was used to inject 2cc of 1% lidocaine without epi into the SubQ.  15cc of nonpurulent, clear yellow synovial fluid aspirated.  This was followed by injection with 2cc of 1% lidocaine without epi, 0.5% bupivicaine and 1CC OF KENALOG.  A sterile bandage was then applied. The patient tolerated the procedure well and there were no complications.\par \par \par

## 2022-04-26 NOTE — DISCUSSION/SUMMARY
[Medication Risks Reviewed] : Medication risks reviewed [de-identified] : The patient is a 67 year old man presenting with chronic left knee pain secondary to knee osteoarthritis.\par \par Imaging/Diagnostics/Medical Records were interpreted and/or reviewed and results were reviewed with the patient in detail.  All questions were answered appropriately.\par \par I discussed the treatment of degenerative arthritis with the patient at length today. I described the spectrum of treatment from nonoperative modalities to total joint arthroplasty. Noninvasive and nonoperative treatment modalities include weight reduction, activity modification with low impact exercise, PRN use of acetaminophen or anti-inflammatory medication if tolerated, natural supplements such as glucosamine/chondroitin, and physical therapy. Further treatments can include corticosteroid injection, hyaluronic acid injections, and orthobiologic such as PRP. Definitive treatment can certainly include total joint arthroplasty, but patient would require surgical consultation to discuss that option further. The risks and benefits of each treatment options was discussed and all questions were answered.\par \par Patient understands that they may require surgery in the future.  After informed patient consent, it has been agreed upon to trial a course of conservative, nonoperative treatment.\par \par After informed consent, and explanation of risks, benefits, alternatives, adverse effects of injection, which includes but is not limited to infection, bleeding, allergic reaction, swelling, soft tissue weakening/tendon rupture, neurovascular injury, injection site complication, fat atrophy, skin depigmentation, failure to improve symptoms, the patient would like to proceed with the procedure - LEFT KNEE ULTRASOUND-GUIDED CORTICOSTEROID INJECTION. See procedure note above. Patient tolerated the procedure well. The patient was provided with postinjection instructions.\par \par Continue PT\par \par Consider HA injections in the future.\par \par ------------------------------------------------------------------------------------------------------------------\par Patient appreciates and agrees with current plan.\par \par The patient's diagnosis above was evaluated by me, personally.  Diagnostic Testing and treatment options were discussed with the patient in detail.  The risks/benefits/potential complications of diagnostic testing/treatments were described in detail.  \par \par This note was generated using a mixture of manual typing and dragon medical dictation software.  A reasonable effort has been made for proofreading its contents, but typos may still remain.  If there are any questions or points of clarification needed please notify my office.\par \par \par >45 minutes of time was spent on total encounter.  >50% of the visit was spent on face-to-face counseling/coordination of care and medical-decision making for this patient.\par \par

## 2022-06-03 ENCOUNTER — APPOINTMENT (OUTPATIENT)
Age: 67
End: 2022-06-03

## 2022-06-03 PROCEDURE — G0121 COLON CA SCRN NOT HI RSK IND: CPT

## 2022-06-17 ENCOUNTER — APPOINTMENT (OUTPATIENT)
Dept: INTERNAL MEDICINE | Facility: CLINIC | Age: 67
End: 2022-06-17
Payer: MEDICARE

## 2022-06-17 ENCOUNTER — NON-APPOINTMENT (OUTPATIENT)
Age: 67
End: 2022-06-17

## 2022-06-17 VITALS
BODY MASS INDEX: 25.55 KG/M2 | HEART RATE: 68 BPM | SYSTOLIC BLOOD PRESSURE: 114 MMHG | DIASTOLIC BLOOD PRESSURE: 60 MMHG | WEIGHT: 159 LBS | OXYGEN SATURATION: 98 % | HEIGHT: 66 IN | TEMPERATURE: 97.1 F

## 2022-06-17 VITALS — SYSTOLIC BLOOD PRESSURE: 100 MMHG | DIASTOLIC BLOOD PRESSURE: 60 MMHG

## 2022-06-17 DIAGNOSIS — R68.89 OTHER GENERAL SYMPTOMS AND SIGNS: ICD-10-CM

## 2022-06-17 DIAGNOSIS — I62.9 NONTRAUMATIC INTRACRANIAL HEMORRHAGE, UNSPECIFIED: ICD-10-CM

## 2022-06-17 DIAGNOSIS — R00.1 BRADYCARDIA, UNSPECIFIED: ICD-10-CM

## 2022-06-17 PROCEDURE — 93000 ELECTROCARDIOGRAM COMPLETE: CPT

## 2022-06-17 PROCEDURE — G0439: CPT

## 2022-06-17 NOTE — ASSESSMENT
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 Hyperlipidemia\par continuoue atorvastatin 20 mg once a day\par continue diet exercise\par \par 5 health maintenance\par colonoscopy done\par \par 6 abnormal GRAZYNA/\par this sonogram Doppler showed mild plaque buildup no symptoms will observe\par aggressive cholesterol-lowering.\par poor prep suggest follow-up in three years\par COVID vaccine up-to-date\par suggested shingles vaccine\par check routine labs.

## 2022-06-17 NOTE — HEALTH RISK ASSESSMENT
[Very Good] : ~his/her~  mood as very good [Never] : Never [No] : No [No falls in past year] : Patient reported no falls in the past year [0] : 2) Feeling down, depressed, or hopeless: Not at all (0) [PHQ-2 Negative - No further assessment needed] : PHQ-2 Negative - No further assessment needed [Patient reported colonoscopy was normal] : Patient reported colonoscopy was normal [HIV test declined] : HIV test declined [Hepatitis C test declined] : Hepatitis C test declined [None] : None [Alone] : lives alone [College] : College [Single] : single [# Of Children ___] : has [unfilled] children [Feels Safe at Home] : Feels safe at home [Fully functional (bathing, dressing, toileting, transferring, walking, feeding)] : Fully functional (bathing, dressing, toileting, transferring, walking, feeding) [Fully functional (using the telephone, shopping, preparing meals, housekeeping, doing laundry, using] : Fully functional and needs no help or supervision to perform IADLs (using the telephone, shopping, preparing meals, housekeeping, doing laundry, using transportation, managing medications and managing finances) [Reports normal functional visual acuity (ie: able to read med bottle)] : Reports normal functional visual acuity [Smoke Detector] : smoke detector [Carbon Monoxide Detector] : carbon monoxide detector [Safety elements used in home] : safety elements used in home [Seat Belt] :  uses seat belt [Sunscreen] : uses sunscreen [de-identified] : Walks [de-identified] : Fairly healthy [TEC9Txrut] : 0 [Change in mental status noted] : No change in mental status noted [Language] : denies difficulty with language [Behavior] : denies difficulty with behavior [Learning/Retaining New Information] : denies difficulty learning/retaining new information [Handling Complex Tasks] : denies difficulty handling complex tasks [Reasoning] : denies difficulty with reasoning [Spatial Ability and Orientation] : denies difficulty with spatial ability and orientation [Sexually Active] : not sexually active [High Risk Behavior] : no high risk behavior [Reports changes in hearing] : Reports no changes in hearing [Reports changes in vision] : Reports no changes in vision [Reports changes in dental health] : Reports no changes in dental health [Guns at Home] : no guns at home [Travel to Developing Areas] : does not  travel to developing areas [TB Exposure] : is not being exposed to tuberculosis [Caregiver Concerns] : does not have caregiver concerns [ColonoscopyDate] : June , 2022 [ColonoscopyComments] : Repeat 2025

## 2022-06-17 NOTE — HISTORY OF PRESENT ILLNESS
[FreeTextEntry1] : \par Comprehensive annual physical examination [de-identified] : The patient is a 67-year-old male with history of small hemorrhage hemorrhagic stroke secondary to AVMs status post coiling procedure complicated by DVT status post anticoagulation with no recurrence, history of prostate cancer status post radioactive seeds, hyperlipidemia, osteoarthritis who presents for comprehensive annual physical examination patient complains of mild knee pain control with Tylenol denies chest pain, shortness of breath distant exertion palpitation lightheadedness dizziness feels well

## 2022-06-17 NOTE — PHYSICAL EXAM
[Normal Sclera/Conjunctiva] : normal sclera/conjunctiva [Normal Outer Ear/Nose] : the outer ears and nose were normal in appearance [Normal Appearance] : normal in appearance [No Masses] : no palpable masses [No Nipple Discharge] : no nipple discharge [No Axillary Lymphadenopathy] : no axillary lymphadenopathy [Normal Sphincter Tone] : normal sphincter tone [No Mass] : no mass [Penis Abnormality] : normal circumcised penis [Scrotum] : the scrotum was normal [Testes Tenderness] : no tenderness of the testes [Prostate Enlarged] : was enlarged [Normal] : affect was normal and insight and judgment were intact [Stool Occult Blood] : stool negative for occult blood [Prostate Nodule] : did not have a nodule [Prostate Tenderness] : was not tender [Prostate Fluctuant] : was not fluctuant [de-identified] : Posterior neck 5 cm fleshy mass

## 2022-08-19 ENCOUNTER — APPOINTMENT (OUTPATIENT)
Dept: INTERNAL MEDICINE | Facility: CLINIC | Age: 67
End: 2022-08-19

## 2022-08-19 VITALS
BODY MASS INDEX: 25.71 KG/M2 | WEIGHT: 160 LBS | TEMPERATURE: 97.9 F | HEIGHT: 66 IN | OXYGEN SATURATION: 97 % | DIASTOLIC BLOOD PRESSURE: 74 MMHG | SYSTOLIC BLOOD PRESSURE: 121 MMHG | HEART RATE: 74 BPM

## 2022-08-19 PROCEDURE — 99214 OFFICE O/P EST MOD 30 MIN: CPT

## 2022-08-19 RX ORDER — DICLOFENAC SODIUM 1% 10 MG/G
1 GEL TOPICAL DAILY
Qty: 1 | Refills: 1 | Status: ACTIVE | COMMUNITY
Start: 2022-08-19 | End: 1900-01-01

## 2022-08-19 NOTE — PHYSICAL EXAM
[No Acute Distress] : no acute distress [Well Nourished] : well nourished [Well Developed] : well developed [Well-Appearing] : well-appearing [Normal Sclera/Conjunctiva] : normal sclera/conjunctiva [PERRL] : pupils equal round and reactive to light [EOMI] : extraocular movements intact [Normal Outer Ear/Nose] : the outer ears and nose were normal in appearance [Normal Oropharynx] : the oropharynx was normal [Normal TMs] : both tympanic membranes were normal [No Respiratory Distress] : no respiratory distress  [No Accessory Muscle Use] : no accessory muscle use [Clear to Auscultation] : lungs were clear to auscultation bilaterally [Normal Rate] : normal rate  [Normal S1, S2] : normal S1 and S2 [No CVA Tenderness] : no CVA  tenderness [No Spinal Tenderness] : no spinal tenderness [No Rash] : no rash [Coordination Grossly Intact] : coordination grossly intact [No Focal Deficits] : no focal deficits [Normal Gait] : normal gait [Speech Grossly Normal] : speech grossly normal [Normal Affect] : the affect was normal [Alert and Oriented x3] : oriented to person, place, and time [Normal Insight/Judgement] : insight and judgment were intact [de-identified] : old Lipoma of neck noted. [de-identified] : Left Knee Crepitus noted. localized left knee effusion noted. Pt walks well today.

## 2022-08-19 NOTE — HISTORY OF PRESENT ILLNESS
[FreeTextEntry1] : Follow-up visit for: L knee pain.\par \par \par  [de-identified] : \par \par 67 Y O Male, with PMH of: left knee pain (started in: Jan 2022), chronic (long-term) fatigue, grief reaction, high chol, Hx of intracranial hemorrhage, lipoma of neck, male ED, prostate neoplasm, right hand pain, OA left knee, OA right knee, sinus bradycardia, snoring, and tinea cruris, presents for: follow-up visit for the left knee pain.\par He had: left knee pain since 1/2022 (According to the pt).\par Denies/ rejects any recent: injuries/ falls/ or accidents.\par He has already done: physical therapy.\par He is already following-up with: Orthopedic Doctor.\par He received: (Left knee) Kenalog injection with Ortho on: 4/26/2022.\par Aggravating factors: running/ jogging.\par Alleviating factors: OTC Aspercreme, or rest.\par \par \par \par

## 2022-08-19 NOTE — PLAN
[FreeTextEntry1] : \par \par \par 1) Left Knee Pain\par Continue with physical therapy.\par Continue seeing Orthopedic Doctor.\par Focus on quad muscle exercises.\par Stretch more.\par Counseled on: fall prevention.\par Lose weight.\par Use (open patella) Knee Brace.\par RICE: the left knee\par Try: Rx Voltaren gel PRN pain.\par \par \par \par 2) Lipoma of neck\par Pt defers for any treatment (for the Lipoma), at this time.\par \par \par \par \par \par Follow-up with the PCP (Dr REYNOSO), in a few months.\par \par All questions answered.\par \par Pt okay with the plan.\par \par \par \par \par \par

## 2022-10-07 ENCOUNTER — APPOINTMENT (OUTPATIENT)
Dept: INTERNAL MEDICINE | Facility: CLINIC | Age: 67
End: 2022-10-07

## 2022-10-07 VITALS
OXYGEN SATURATION: 97 % | WEIGHT: 154 LBS | HEIGHT: 67 IN | HEART RATE: 73 BPM | TEMPERATURE: 98 F | SYSTOLIC BLOOD PRESSURE: 127 MMHG | DIASTOLIC BLOOD PRESSURE: 73 MMHG | BODY MASS INDEX: 24.17 KG/M2

## 2022-10-07 PROCEDURE — 99213 OFFICE O/P EST LOW 20 MIN: CPT

## 2022-10-07 NOTE — PLAN
[FreeTextEntry1] : \par \par \par 1) Left Knee Pain\par See: Physcial therapy and Orthopedic.\par RICE, the left knee.\par Continue using: Knee Brace.\par \par \par \par 2) Lipoma of neck\par See: General Surgery, soon. (See referral).\par \par \par \par 3) Needs flu shot\par Flu shot given to pt, today.\par Pt tolerated it well.\par \par \par \par \par PCP (Dr REYNOSO) in the exam room, today.\par \par \par \par All pt's questions answered. Pt okay with the plan. \par \par

## 2022-10-07 NOTE — PHYSICAL EXAM
[No Acute Distress] : no acute distress [Well Nourished] : well nourished [Well Developed] : well developed [Well-Appearing] : well-appearing [Normal Sclera/Conjunctiva] : normal sclera/conjunctiva [PERRL] : pupils equal round and reactive to light [EOMI] : extraocular movements intact [Normal Outer Ear/Nose] : the outer ears and nose were normal in appearance [Normal Oropharynx] : the oropharynx was normal [No Respiratory Distress] : no respiratory distress  [No Accessory Muscle Use] : no accessory muscle use [Clear to Auscultation] : lungs were clear to auscultation bilaterally [Normal Rate] : normal rate  [Normal S1, S2] : normal S1 and S2 [No CVA Tenderness] : no CVA  tenderness [No Spinal Tenderness] : no spinal tenderness [No Rash] : no rash [Coordination Grossly Intact] : coordination grossly intact [No Focal Deficits] : no focal deficits [Normal Gait] : normal gait [Memory Grossly Normal] : memory grossly normal [Normal Affect] : the affect was normal [Alert and Oriented x3] : oriented to person, place, and time [Normal Mood] : the mood was normal [Normal Insight/Judgement] : insight and judgment were intact [de-identified] : + old Lipoma of neck noted. [de-identified] : + Left Knee Crepitus noted. localized left knee effusion noted. Pt walks well today.

## 2022-10-07 NOTE — HISTORY OF PRESENT ILLNESS
[FreeTextEntry1] : L Knee Pain. Follow-up.\par  [de-identified] : \par \par 67 Y O Male, with PMH of: left knee pain (started in: Jan 2022), chronic (long-term) fatigue, grief reaction, high chol, Hx of intracranial hemorrhage, lipoma of neck, male ED, prostate neoplasm, right hand pain, OA left knee, OA right knee, sinus bradycardia, snoring, and tinea cruris, presents for: follow-up visit for the: left knee pain.\par He had: left knee pain since: 1/2022 (According to the pt).\par Denies/ rejects any recent: injuries/ falls/ or accidents.\par He has already done: physical therapy; he wants to continue with: physical therapy.\par He is already following-up with: Orthopedic Doctor.\par He received: (Left knee) Kenalog injection with Ortho on: 4/26/2022.\par Aggravating factors: running/ jogging.\par Alleviating factors: OTC Aspercreme, or rest.\par He needs: Flu shot, today.\par

## 2022-10-28 ENCOUNTER — APPOINTMENT (OUTPATIENT)
Dept: ORTHOPEDIC SURGERY | Facility: CLINIC | Age: 67
End: 2022-10-28

## 2022-10-28 ENCOUNTER — RESULT REVIEW (OUTPATIENT)
Age: 67
End: 2022-10-28

## 2022-10-28 ENCOUNTER — OUTPATIENT (OUTPATIENT)
Dept: OUTPATIENT SERVICES | Facility: HOSPITAL | Age: 67
LOS: 1 days | End: 2022-10-28
Payer: MEDICARE

## 2022-10-28 VITALS
OXYGEN SATURATION: 98 % | SYSTOLIC BLOOD PRESSURE: 124 MMHG | HEIGHT: 67 IN | DIASTOLIC BLOOD PRESSURE: 63 MMHG | HEART RATE: 61 BPM | BODY MASS INDEX: 24.17 KG/M2 | WEIGHT: 154 LBS

## 2022-10-28 PROCEDURE — 73564 X-RAY EXAM KNEE 4 OR MORE: CPT | Mod: 26,50

## 2022-10-28 PROCEDURE — 20610 DRAIN/INJ JOINT/BURSA W/O US: CPT | Mod: LT

## 2022-10-28 PROCEDURE — 72170 X-RAY EXAM OF PELVIS: CPT | Mod: 26

## 2022-10-28 PROCEDURE — 99214 OFFICE O/P EST MOD 30 MIN: CPT | Mod: 25

## 2022-10-28 PROCEDURE — 73564 X-RAY EXAM KNEE 4 OR MORE: CPT

## 2022-10-28 PROCEDURE — 72170 X-RAY EXAM OF PELVIS: CPT

## 2022-11-01 NOTE — PROCEDURE
[de-identified] : Procedure: Knee joint aspiration and injection\par Laterality: left \par Indication: Osteoarthritis - discussed with patient\par Skin prep: alcohol and chlorhexidine\par Anesthesia: ethyl chloride spray\par Needle: 18-gauge\par Portal: superolateral\par Aspiration: 12 cc normal synovial fluid \par Injectate: 2cc of 2% lidocaine, 2cc of 0.5% bupivacaine, and 1cc of 40mg/mL triamcinolone\par Dressing: Band-aid\par Complications: None

## 2022-11-01 NOTE — END OF VISIT
[FreeTextEntry3] : All medical record entries made by the Scribe were at my, Dr. Dave Garg, direction and personally dictated by me on 10/28/2022. I have reviewed the chart and agree that the record accurately reflects my personal performance of the history, physical exam, assessment and plan. I have also personally directed, reviewed, and agreed with the chart.

## 2022-11-01 NOTE — DISCUSSION/SUMMARY
[de-identified] : 66 y/o male with severe left knee osteoarthritis. \par - We discussed the diagnosis, natural history, and treatment options ranging from conservative to invasive. While I do think that he would be a very good candidate for left TKA, he is not quite ready to consider it at this time. Therefore we will continue with conservative management. \par - He will be given a referral for PT as well as home exercise program for knee conditioning. We will prescribe him meloxicam and he will be encouraged to take that with Tylenol as needed. \par - We will perform a left knee aspiration and CSI today. \par - He will f/u in 3 months for reevaluation; no new XRs needed until next October. \par - He noted he may be willing to consider knee replacement in 2023. This will depend on his clinical progress over time.

## 2022-11-01 NOTE — PHYSICAL EXAM
[de-identified] : General appearance: well nourished and hydrated, pleasant, alert and oriented x 3, cooperative.  \par HEENT: normocephalic, EOM intact, wearing mask, external auditory canal clear.  \par Cardiovascular: no lower leg edema, no varicosities, dorsalis pedis pulses palpable and symmetric.  \par Lymphatics: no palpable lymphadenopathy, no lymphedema.  \par Neurologic: sensation is normal, no muscle weakness in upper or lower extremities, patella tendon reflexes present and symmetric.  \par Dermatologic: skin moist, warm, no rash.  \par Spine: cervical spine with normal lordosis and painless range of motion, thoracic spine with normal kyphosis and painless range of motion, lumbosacral spine with normal lordosis and painless range of motion.  No tenderness to palpation along midline spine and paraspinal musculature.  Sacroiliac joints nontender bilaterally. Negative SLR and crossed SLR tests bilaterally.\par Gait: varus thrust, using a semi rigid velcro support brace. \par \par Left knee: \par - Focal soft tissue swelling: none\par - Ecchymosis: none\par - Erythema: none\par - Effusion: small, small Baker's cyst\par - Wounds: none\par - Alignment: marked varus\par - Tenderness: isolated peripatellar ttp \par - ROM: 3-100\par - Collateral laxity: none\par - Cruciate laxity: none\par - Popliteal angle (degrees): 75\par - Quad strength: 5/5\par \par Right knee:\par - Focal soft tissue swelling: none\par - Ecchymosis: none\par - Erythema: none\par - Effusion: none\par - Wounds: none\par - Alignment: varus\par - Tenderness: none\par - ROM: 0-110\par - Collateral laxity: none\par - Cruciate laxity: none\par - Popliteal angle (degrees): 70\par - Quad strength: 5/5\par \par - Bilateral ankles demonstrate some equinus contractures, most pronounced on the right, but ankle dorsiflexion strength is 5/5 b/l [de-identified] : AP pelvis and 4 views of the bilateral knees (weightbearing AP, weightbearing Brooks, weightbearing lateral, and Sunrise) were interpreted by me and reviewed with the patient.\par \par Location of imaging: [facility]\par Date of exam: 10/28/2022\par \par Pelvic alignment: mild obliquity with left side up. There appear to be damari opaque threads in the region of the prostate. \par \par Right hip -- \par Arthritis: none \par Deformity: none\par Osteonecrosis: none \par \par Left hip -- \par Arthritis: none \par Deformity: none\par Osteonecrosis: none  \par \par Right knee -- \par Alignment: normal\par Arthritis: none\par Patellar height: normal\par Patellar tracking: central\par \par Left knee -- \par Alignment: mild varus\par Arthritis: medial and patellofemoral compartment OA, most pronounced medially with bone on bone articulation, KL 4. There is subchondral cystic and sclerotic change of the medial compartment, predominantly on the femoral side, possibly consistent with prior osteochondral injury. Lateral compartment appears to be well preserved. \par Patellar height: normal\par Patellar tracking: central

## 2022-11-01 NOTE — ADDENDUM
[FreeTextEntry1] : Documented by Vivian Hussein acting as a scribe for Dr. Dave Garg on 10/28/2022.

## 2022-11-01 NOTE — HISTORY OF PRESENT ILLNESS
[5] : a current pain level of 5/10 [Walking] : worsened by walking [Ice] : relieved by ice [Rest] : relieved by rest [de-identified] : 10/28/2022: 66 y/o male presents for evaluation of L knee OA. He reports an onset of pain around January 2022 which was atraumatic. Treatments thus far have included Aleve, a left knee aspiration and CSI by Dr. Thorne in April 2022, and PT for several months, but he notes persistent pain and loss of function. His ambulatory tolerance is limited to 2 blocks without an assistive device. He does use an assistive left knee brace. He reports minimal contralateral knee pain.  [de-identified] : pt  states throbbing

## 2023-02-03 ENCOUNTER — APPOINTMENT (OUTPATIENT)
Dept: ORTHOPEDIC SURGERY | Facility: CLINIC | Age: 68
End: 2023-02-03
Payer: MEDICARE

## 2023-02-03 VITALS
OXYGEN SATURATION: 98 % | DIASTOLIC BLOOD PRESSURE: 56 MMHG | HEART RATE: 62 BPM | SYSTOLIC BLOOD PRESSURE: 102 MMHG | HEIGHT: 67 IN | WEIGHT: 154 LBS | BODY MASS INDEX: 24.17 KG/M2

## 2023-02-03 DIAGNOSIS — M25.562 PAIN IN LEFT KNEE: ICD-10-CM

## 2023-02-03 PROCEDURE — 99214 OFFICE O/P EST MOD 30 MIN: CPT

## 2023-02-06 NOTE — END OF VISIT
[FreeTextEntry3] : All medical record entries made by the Scribe were at my, Dr. Dave Garg, direction and personally dictated by me on 02/03/2023. I have reviewed the chart and agree that the record accurately reflects my personal performance of the history, physical exam, assessment and plan. I have also personally directed, reviewed, and agreed with the chart.

## 2023-02-06 NOTE — DISCUSSION/SUMMARY
[de-identified] : 68 y/o male with severe left knee osteoarthritis, symptoms refractory to conservative management. \par - He is indicated at this time for left total knee arthroplasty with Mariangel robotic assistance. \par - We discussed the details of the procedure, the expected recovery period, and the expected outcome. We discussed the likelihood of satisfaction after complete recovery, and the potential causes of dissatisfaction. The importance of active patient participation in the rehabilitation protocol was emphasized, along with its influence on short and long-term outcomes. Specific risks of total knee replacement were discussed in detail. We discussed the risk of surgical site complications including but not limited to: surgical site infection, wound healing complications, bone fracture, tendon or ligament injury, neurovascular injury, hemorrhage, postoperative stiffness or instability, persistent pain and need for reoperation or manipulation under anesthesia. We discussed surgical blood loss and the possible need for blood transfusion. We discussed the risk of perioperative medical complications, including but not limited to catheter-associated urinary tract infection, venous thromboembolism and other cardiopulmonary complications. We discussed anesthetic options and the risk of anesthesia-related complications. We discussed implant fixation methods; my plan would be to use fully cemented fixation in this case. We discussed the variable need to resurface the patella; my plan would be to resurface in this case. We discussed the durability of prosthetic knees and limitations related to wear, osteolysis and loosening.  We discussed the role of the robot in helping to guide bone resections and measure alignment and soft tissue balance. All questions were answered the patient's satisfaction. The patient was given a copy of my preoperative packet with additional information about the procedure. I asked the patient to either call back or schedule a followup appointment for any additional questions or concerns regarding the procedure. \par - He will be booked for surgery for a convenient time with routine medical clearance. He indicated that he would like to plan for mid April 2023, secondary to some work being done on his house in late March 2023. \par - I encouraged him to stay active with [facility] in the meantime leading up to surgery.

## 2023-02-06 NOTE — HISTORY OF PRESENT ILLNESS
[de-identified] : 02/03/2023: 66 y/o male presenting for f/u of left knee OA. Patient was administered a left knee aspiration and CSI during his last visit. He states this injection provided 2 days of relief. He was unable to f/u with PT due to insurance issues but today would like to move forward with left TKA and would like to plan for end of March 2023. \par \par 10/28/2022: 66 y/o male presents for evaluation of L knee OA. He reports an onset of pain around January 2022 which was atraumatic. Treatments thus far have included Aleve, a left knee aspiration and CSI by Dr. Thorne in April 2022, and PT for several months, but he notes persistent pain and loss of function. His ambulatory tolerance is limited to 2 blocks without an assistive device. He does use an assistive left knee brace. He reports minimal contralateral knee pain.

## 2023-02-06 NOTE — PHYSICAL EXAM
[de-identified] : General appearance: well nourished and hydrated, pleasant, alert and oriented x 3, cooperative. \par HEENT: normocephalic, EOM intact, wearing mask, external auditory canal clear. \par Cardiovascular: no lower leg edema, no varicosities, dorsalis pedis pulses palpable and symmetric. \par Lymphatics: no palpable lymphadenopathy, no lymphedema. \par Neurologic: sensation is normal, no muscle weakness in upper or lower extremities, patella tendon reflexes present and symmetric. \par Dermatologic: skin moist, warm, no rash. \par Spine: cervical spine with normal lordosis and painless range of motion, thoracic spine with normal kyphosis and painless range of motion, lumbosacral spine with normal lordosis and painless range of motion. No tenderness to palpation along midline spine and paraspinal musculature. Sacroiliac joints nontender bilaterally. Negative SLR and crossed SLR tests bilaterally.\par Gait: varus thrust, using a semi rigid velcro support brace. \par \par Left knee: \par - Focal soft tissue swelling: none\par - Ecchymosis: none\par - Erythema: none\par - Effusion: small, small Baker's cyst\par - Wounds: none\par - Alignment: marked varus\par - Tenderness: isolated peripatellar ttp \par - ROM: 3-100\par - Collateral laxity: none\par - Cruciate laxity: none\par - Popliteal angle (degrees): 75\par - Quad strength: 5/5\par \par Right knee:\par - Focal soft tissue swelling: none\par - Ecchymosis: none\par - Erythema: none\par - Effusion: none\par - Wounds: none\par - Alignment: varus\par - Tenderness: none\par - ROM: 0-110\par - Collateral laxity: none\par - Cruciate laxity: none\par - Popliteal angle (degrees): 70\par - Quad strength: 5/5

## 2023-02-06 NOTE — ADDENDUM
[FreeTextEntry1] : Documented by Vivian Hussein acting as a scribe for Dr. Dave Garg on 02/03/2023.

## 2023-02-13 ENCOUNTER — RESULT REVIEW (OUTPATIENT)
Age: 68
End: 2023-02-13

## 2023-02-13 ENCOUNTER — OUTPATIENT (OUTPATIENT)
Dept: OUTPATIENT SERVICES | Facility: HOSPITAL | Age: 68
LOS: 1 days | End: 2023-02-13
Payer: MEDICARE

## 2023-02-13 PROCEDURE — 71046 X-RAY EXAM CHEST 2 VIEWS: CPT | Mod: 26

## 2023-02-13 PROCEDURE — 71046 X-RAY EXAM CHEST 2 VIEWS: CPT

## 2023-04-14 ENCOUNTER — NON-APPOINTMENT (OUTPATIENT)
Age: 68
End: 2023-04-14

## 2023-04-14 ENCOUNTER — APPOINTMENT (OUTPATIENT)
Dept: INTERNAL MEDICINE | Facility: CLINIC | Age: 68
End: 2023-04-14
Payer: MEDICARE

## 2023-04-14 VITALS
DIASTOLIC BLOOD PRESSURE: 57 MMHG | WEIGHT: 161 LBS | SYSTOLIC BLOOD PRESSURE: 129 MMHG | HEIGHT: 67 IN | OXYGEN SATURATION: 99 % | BODY MASS INDEX: 25.27 KG/M2 | HEART RATE: 73 BPM | TEMPERATURE: 97.3 F

## 2023-04-14 VITALS — DIASTOLIC BLOOD PRESSURE: 80 MMHG | SYSTOLIC BLOOD PRESSURE: 110 MMHG

## 2023-04-14 DIAGNOSIS — M75.81 OTHER SHOULDER LESIONS, RIGHT SHOULDER: ICD-10-CM

## 2023-04-14 DIAGNOSIS — Z01.818 ENCOUNTER FOR OTHER PREPROCEDURAL EXAMINATION: ICD-10-CM

## 2023-04-14 DIAGNOSIS — Z86.19 PERSONAL HISTORY OF OTHER INFECTIOUS AND PARASITIC DISEASES: ICD-10-CM

## 2023-04-14 DIAGNOSIS — F43.21 ADJUSTMENT DISORDER WITH DEPRESSED MOOD: ICD-10-CM

## 2023-04-14 DIAGNOSIS — M79.641 PAIN IN RIGHT HAND: ICD-10-CM

## 2023-04-14 DIAGNOSIS — M25.562 PAIN IN LEFT KNEE: ICD-10-CM

## 2023-04-14 DIAGNOSIS — Z87.898 PERSONAL HISTORY OF OTHER SPECIFIED CONDITIONS: ICD-10-CM

## 2023-04-14 DIAGNOSIS — Z85.46 PERSONAL HISTORY OF MALIGNANT NEOPLASM OF PROSTATE: ICD-10-CM

## 2023-04-14 LAB
BILIRUB UR QL STRIP: NORMAL
GLUCOSE UR-MCNC: NORMAL
HCG UR QL: 0.2 EU/DL
HGB UR QL STRIP.AUTO: NORMAL
KETONES UR-MCNC: NORMAL
LEUKOCYTE ESTERASE UR QL STRIP: NORMAL
NITRITE UR QL STRIP: NORMAL
PH UR STRIP: 6.5
PROT UR STRIP-MCNC: NORMAL
SP GR UR STRIP: 1.02

## 2023-04-14 PROCEDURE — 81003 URINALYSIS AUTO W/O SCOPE: CPT | Mod: QW

## 2023-04-14 PROCEDURE — 99215 OFFICE O/P EST HI 40 MIN: CPT | Mod: 25

## 2023-04-14 PROCEDURE — 93000 ELECTROCARDIOGRAM COMPLETE: CPT

## 2023-04-14 PROCEDURE — 36415 COLL VENOUS BLD VENIPUNCTURE: CPT

## 2023-04-14 NOTE — ASSESSMENT
[Patient Optimized for Surgery] : Patient optimized for surgery [No Further Testing Recommended] : no further testing recommended [FreeTextEntry4] : Patient is a 68-year-old male with history of small stroke secondary to AVM status post coiling complicated by DVT, hypercholesterolemia, history of prostate cancer status post seed implants osteoarthritis who presents for preoperative valuation prior to left knee replacement\par \par 1 preoperative valuation\par patient has no contraindication to general or local anesthesia and would be considered low risk for this low to moderate risk surgery\par EKG showed sinus rhythm no ischemic changes\par patient is optimized for surgery\par will check routine labs\par avoid nonsteroidal's one week prior to surgery May use Tylenol\par \par 2 history of DVT\par places patient at high risk with use DVT prophylaxis as per protocol\par \par 3. Hyperlipidemia\par May continue atorvastatin 20 mg with a sip of water\par \par 4. History of prostate cancer\par on Flomax 0.4 maybe continue with sip of water

## 2023-04-14 NOTE — HISTORY OF PRESENT ILLNESS
[No Pertinent Cardiac History] : no history of aortic stenosis, atrial fibrillation, coronary artery disease, recent myocardial infarction, or implantable device/pacemaker [No Pertinent Pulmonary History] : no history of asthma, COPD, sleep apnea, or smoking [No Adverse Anesthesia Reaction] : no adverse anesthesia reaction in self or family member [(Patient denies any chest pain, claudication, dyspnea on exertion, orthopnea, palpitations or syncope)] : Patient denies any chest pain, claudication, dyspnea on exertion, orthopnea, palpitations or syncope [Moderate (4-6 METs)] : Moderate (4-6 METs) [Chronic Anticoagulation] : no chronic anticoagulation [Chronic Kidney Disease] : no chronic kidney disease [Diabetes] : no diabetes [FreeTextEntry1] : Left knee replacement [FreeTextEntry2] : April 24, 2023 [FreeTextEntry3] : Dr. Dave Garg [FreeTextEntry4] : The patient is a 68-year-old white male with history of small hemorrhagic stroke secondary to AVM status post coiling procedure complicated by DVT status post anticoagulation for three months with no recurrence, history of prostate cancer status post radiation seeds, hypercholesterolemia and osteoarthritis who presents for preoperative evaluation prior to left knee replacement. Patient has a good exercise tolerance can walk several blocks without chest pain shortness of breath this exertion palpitation lightheadedness limited by left knee pain. He denies fever, chills, anesthesia reaction, or dysuria, cough, nausea vomiting, diarrhea, easy bleeding or bruising. Or anesthesia reactions

## 2023-04-14 NOTE — PHYSICAL EXAM
Consult- Cardiology [Normal Sclera/Conjunctiva] : normal sclera/conjunctiva [Normal Outer Ear/Nose] : the outer ears and nose were normal in appearance [Normal] : affect was normal and insight and judgment were intact

## 2023-04-21 VITALS
SYSTOLIC BLOOD PRESSURE: 118 MMHG | RESPIRATION RATE: 16 BRPM | OXYGEN SATURATION: 98 % | WEIGHT: 155.87 LBS | DIASTOLIC BLOOD PRESSURE: 70 MMHG | TEMPERATURE: 98 F | HEART RATE: 68 BPM | HEIGHT: 67 IN

## 2023-04-21 RX ORDER — POVIDONE-IODINE 5 %
1 AEROSOL (ML) TOPICAL ONCE
Refills: 0 | Status: COMPLETED | OUTPATIENT
Start: 2023-04-24 | End: 2023-04-24

## 2023-04-21 RX ORDER — OXYBUTYNIN CHLORIDE 5 MG
1 TABLET ORAL
Refills: 0 | DISCHARGE

## 2023-04-21 RX ORDER — DICLOFENAC SODIUM 30 MG/G
2 GEL TOPICAL
Refills: 0 | DISCHARGE

## 2023-04-21 RX ORDER — ASPIRIN 81 MG/1
81 TABLET ORAL
Qty: 60 | Refills: 0 | Status: ACTIVE | COMMUNITY
Start: 2023-04-21 | End: 1900-01-01

## 2023-04-21 RX ORDER — PANTOPRAZOLE 40 MG/1
40 TABLET, DELAYED RELEASE ORAL DAILY
Qty: 30 | Refills: 2 | Status: ACTIVE | COMMUNITY
Start: 2023-04-21 | End: 1900-01-01

## 2023-04-21 RX ORDER — CELECOXIB 100 MG/1
100 CAPSULE ORAL
Qty: 60 | Refills: 2 | Status: ACTIVE | COMMUNITY
Start: 2023-04-21 | End: 1900-01-01

## 2023-04-21 RX ORDER — OXYCODONE 5 MG/1
5 TABLET ORAL
Qty: 50 | Refills: 0 | Status: ACTIVE | COMMUNITY
Start: 2023-04-21 | End: 1900-01-01

## 2023-04-21 RX ORDER — MELOXICAM 15 MG/1
1 TABLET ORAL
Refills: 0 | DISCHARGE

## 2023-04-21 RX ORDER — ACETAMINOPHEN 500 MG/1
500 TABLET ORAL
Qty: 180 | Refills: 2 | Status: ACTIVE | COMMUNITY
Start: 2023-04-21 | End: 1900-01-01

## 2023-04-21 NOTE — H&P ADULT - NSHPPHYSICALEXAM_GEN_ALL_CORE
MSK: Decreased left knee ROM secondary to pain      Rest of PE per MD clearance MSK: Decreased left knee ROM secondary to pain  MSK: No deformity or open wounds. No rashes or lesions. EHL/TA/GS/FHL 5/5 BLE. Sensation intact and equal BLE. Skin warm and well perfused. DP palpable BLE. Cap refill brisk.   Rest of PE per MD clearance

## 2023-04-21 NOTE — H&P ADULT - PROBLEM SELECTOR PLAN 1
Admit to Orthopaedic Service.  Presents today for elective Left total knee arthroplasty  Pt medically stable and cleared for procedure today by Dr. Prado

## 2023-04-21 NOTE — H&P ADULT - NSHPLABSRESULTS_GEN_ALL_CORE
Preop CBC, BMP, PT/INR, PTT within normal range and reviewed per medical clearance  Cr- 1.04  A1C: 5.6  UA: trace ketones, reviewed per medical clearance  Preop CXR within normal limits and reviewed per medical clearance   Preop EKG within normal limits and reviewed per medical clearance  3M: DOS

## 2023-04-21 NOTE — ASU PREOP CHECKLIST - 2.
ESCORT - ESCORT - NO ESCORT - PT WANTS TO GO TO A REHAB FACILITY - Radha from Dr. Garg's office is going to contact him regarding information of surgery time and Rehab Facility Setup
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
## 2023-04-21 NOTE — H&P ADULT - HISTORY OF PRESENT ILLNESS
68y M with left knee pain x     Presents today for elective left total knee arthroplasty 68y M with left knee pain x     Hx of stroke and Dvt.    Presents today for elective left total knee arthroplasty 68y M with left knee pain x over 1 year without accident or injury to bring on pain. Pain is tolerate when sitting and lying but worse with walking. Pt cannot run or jump due to his pain. His pain persists despite conservative measures including physical therapy. He ambulates with no assistance.     Hx of stroke and DVT, now s/p IVC filter placement .    Presents today for elective left total knee arthroplasty

## 2023-04-21 NOTE — H&P ADULT - NSICDXPASTSURGICALHX_GEN_ALL_CORE_FT
PAST SURGICAL HISTORY:  Bilateral Cataract Surgery 2009    Bilateral Corneal Transplant 2009    Insertion  Radioactive Prostate Seeds ' 2007    S/P Insertion of IVC (Inferior Vena Caval) Filter 1/24/11    S/P Tonsillectomy childhood

## 2023-04-21 NOTE — ASU PATIENT PROFILE, ADULT - NSICDXPASTSURGICALHX_GEN_ALL_CORE_FT
PAST SURGICAL HISTORY:  Bilateral Cataract Surgery 2009    Bilateral Corneal Transplant 2009    Insertion  Radioactive Prostate Seeds 2007    S/P Insertion of IVC (Inferior Vena Caval) Filter 1/24/11    S/P Tonsillectomy childhood

## 2023-04-21 NOTE — ASU PREOP CHECKLIST - ASSESSMENT, HISTORY & PHYSICAL COMPLETED AND ON MEDICAL RECORD
Patient has returned from [address] [facility] and is resting abed, vss. Will continue to monitor. Call light remains in reach 4/14/done

## 2023-04-23 ENCOUNTER — TRANSCRIPTION ENCOUNTER (OUTPATIENT)
Age: 68
End: 2023-04-23

## 2023-04-24 ENCOUNTER — APPOINTMENT (OUTPATIENT)
Dept: ORTHOPEDIC SURGERY | Facility: HOSPITAL | Age: 68
End: 2023-04-24

## 2023-04-24 ENCOUNTER — TRANSCRIPTION ENCOUNTER (OUTPATIENT)
Age: 68
End: 2023-04-24

## 2023-04-24 ENCOUNTER — INPATIENT (INPATIENT)
Facility: HOSPITAL | Age: 68
LOS: 3 days | Discharge: HOME HEALTH | DRG: 470 | End: 2023-04-28
Attending: ORTHOPAEDIC SURGERY | Admitting: ORTHOPAEDIC SURGERY
Payer: MEDICARE

## 2023-04-24 ENCOUNTER — RESULT REVIEW (OUTPATIENT)
Age: 68
End: 2023-04-24

## 2023-04-24 DIAGNOSIS — M17.12 UNILATERAL PRIMARY OSTEOARTHRITIS, LEFT KNEE: ICD-10-CM

## 2023-04-24 DIAGNOSIS — C61 MALIGNANT NEOPLASM OF PROSTATE: ICD-10-CM

## 2023-04-24 DIAGNOSIS — I82.409 ACUTE EMBOLISM AND THROMBOSIS OF UNSPECIFIED DEEP VEINS OF UNSPECIFIED LOWER EXTREMITY: ICD-10-CM

## 2023-04-24 DIAGNOSIS — I63.9 CEREBRAL INFARCTION, UNSPECIFIED: ICD-10-CM

## 2023-04-24 DIAGNOSIS — E78.5 HYPERLIPIDEMIA, UNSPECIFIED: ICD-10-CM

## 2023-04-24 PROCEDURE — S2900 ROBOTIC SURGICAL SYSTEM: CPT | Mod: NC

## 2023-04-24 PROCEDURE — 27447 TOTAL KNEE ARTHROPLASTY: CPT | Mod: LT

## 2023-04-24 PROCEDURE — 73560 X-RAY EXAM OF KNEE 1 OR 2: CPT | Mod: 26,LT

## 2023-04-24 DEVICE — CEMENT PALACOS R: Type: IMPLANTABLE DEVICE | Site: LEFT | Status: FUNCTIONAL

## 2023-04-24 DEVICE — FEM PERSONA CMT CCR STD S 27 L: Type: IMPLANTABLE DEVICE | Site: LEFT | Status: FUNCTIONAL

## 2023-04-24 DEVICE — ZIMMER/NEXGEN SMOOTH PIN 3.2X75MM: Type: IMPLANTABLE DEVICE | Site: LEFT | Status: FUNCTIONAL

## 2023-04-24 DEVICE — ZIMMER/NEXGEN HEX HEAD SCREW 3.5MM: Type: IMPLANTABLE DEVICE | Site: LEFT | Status: FUNCTIONAL

## 2023-04-24 DEVICE — IMPLANTABLE DEVICE: Type: IMPLANTABLE DEVICE | Site: LEFT | Status: FUNCTIONAL

## 2023-04-24 DEVICE — STEM EXT PERSONA 14MM PLUS 30M: Type: IMPLANTABLE DEVICE | Site: LEFT | Status: FUNCTIONAL

## 2023-04-24 DEVICE — SURF ART PERSONA LT 6-9 EF 11MM: Type: IMPLANTABLE DEVICE | Site: LEFT | Status: FUNCTIONAL

## 2023-04-24 DEVICE — ZIMMER FEMALE HEX SCREW MAGNETIC 2.5MM X 25MM: Type: IMPLANTABLE DEVICE | Site: LEFT | Status: FUNCTIONAL

## 2023-04-24 DEVICE — CELLERATE SURGICAL POWDER RX 5GM: Type: IMPLANTABLE DEVICE | Site: LEFT | Status: FUNCTIONAL

## 2023-04-24 DEVICE — STEM TIB PSN SZ E L 5 DEG: Type: IMPLANTABLE DEVICE | Site: LEFT | Status: FUNCTIONAL

## 2023-04-24 RX ORDER — ASPIRIN/CALCIUM CARB/MAGNESIUM 324 MG
81 TABLET ORAL
Refills: 0 | Status: DISCONTINUED | OUTPATIENT
Start: 2023-04-25 | End: 2023-04-28

## 2023-04-24 RX ORDER — ONDANSETRON 8 MG/1
4 TABLET, FILM COATED ORAL EVERY 6 HOURS
Refills: 0 | Status: DISCONTINUED | OUTPATIENT
Start: 2023-04-24 | End: 2023-04-28

## 2023-04-24 RX ORDER — POLYETHYLENE GLYCOL 3350 17 G/17G
17 POWDER, FOR SOLUTION ORAL AT BEDTIME
Refills: 0 | Status: DISCONTINUED | OUTPATIENT
Start: 2023-04-24 | End: 2023-04-28

## 2023-04-24 RX ORDER — CEFAZOLIN SODIUM 1 G
2000 VIAL (EA) INJECTION EVERY 8 HOURS
Refills: 0 | Status: COMPLETED | OUTPATIENT
Start: 2023-04-24 | End: 2023-04-25

## 2023-04-24 RX ORDER — TAMSULOSIN HYDROCHLORIDE 0.4 MG/1
1 CAPSULE ORAL
Refills: 0 | DISCHARGE

## 2023-04-24 RX ORDER — ACETAMINOPHEN 500 MG
975 TABLET ORAL EVERY 8 HOURS
Refills: 0 | Status: DISCONTINUED | OUTPATIENT
Start: 2023-04-24 | End: 2023-04-28

## 2023-04-24 RX ORDER — KETOROLAC TROMETHAMINE 30 MG/ML
15 SYRINGE (ML) INJECTION EVERY 6 HOURS
Refills: 0 | Status: DISCONTINUED | OUTPATIENT
Start: 2023-04-24 | End: 2023-04-25

## 2023-04-24 RX ORDER — ATORVASTATIN CALCIUM 80 MG/1
1 TABLET, FILM COATED ORAL
Refills: 0 | DISCHARGE

## 2023-04-24 RX ORDER — CELECOXIB 200 MG/1
400 CAPSULE ORAL ONCE
Refills: 0 | Status: COMPLETED | OUTPATIENT
Start: 2023-04-24 | End: 2023-04-24

## 2023-04-24 RX ORDER — ACETAMINOPHEN 500 MG
1000 TABLET ORAL ONCE
Refills: 0 | Status: COMPLETED | OUTPATIENT
Start: 2023-04-24 | End: 2023-04-24

## 2023-04-24 RX ORDER — ASCORBIC ACID 60 MG
1 TABLET,CHEWABLE ORAL
Refills: 0 | DISCHARGE

## 2023-04-24 RX ORDER — CELECOXIB 200 MG/1
200 CAPSULE ORAL EVERY 12 HOURS
Refills: 0 | Status: DISCONTINUED | OUTPATIENT
Start: 2023-04-25 | End: 2023-04-25

## 2023-04-24 RX ORDER — CLOTRIMAZOLE AND BETAMETHASONE DIPROPIONATE 10; .5 MG/G; MG/G
1 CREAM TOPICAL
Refills: 0 | DISCHARGE

## 2023-04-24 RX ORDER — CHOLECALCIFEROL (VITAMIN D3) 125 MCG
1 CAPSULE ORAL
Refills: 0 | DISCHARGE

## 2023-04-24 RX ORDER — HYDROMORPHONE HYDROCHLORIDE 2 MG/ML
0.5 INJECTION INTRAMUSCULAR; INTRAVENOUS; SUBCUTANEOUS
Refills: 0 | Status: DISCONTINUED | OUTPATIENT
Start: 2023-04-24 | End: 2023-04-28

## 2023-04-24 RX ORDER — HYDROMORPHONE HYDROCHLORIDE 2 MG/ML
0.5 INJECTION INTRAMUSCULAR; INTRAVENOUS; SUBCUTANEOUS EVERY 4 HOURS
Refills: 0 | Status: DISCONTINUED | OUTPATIENT
Start: 2023-04-24 | End: 2023-04-28

## 2023-04-24 RX ORDER — CHLORHEXIDINE GLUCONATE 213 G/1000ML
1 SOLUTION TOPICAL ONCE
Refills: 0 | Status: COMPLETED | OUTPATIENT
Start: 2023-04-24 | End: 2023-04-24

## 2023-04-24 RX ORDER — ATORVASTATIN CALCIUM 80 MG/1
20 TABLET, FILM COATED ORAL AT BEDTIME
Refills: 0 | Status: DISCONTINUED | OUTPATIENT
Start: 2023-04-24 | End: 2023-04-28

## 2023-04-24 RX ORDER — APREPITANT 80 MG/1
40 CAPSULE ORAL ONCE
Refills: 0 | Status: COMPLETED | OUTPATIENT
Start: 2023-04-24 | End: 2023-04-24

## 2023-04-24 RX ORDER — PANTOPRAZOLE SODIUM 20 MG/1
40 TABLET, DELAYED RELEASE ORAL
Refills: 0 | Status: DISCONTINUED | OUTPATIENT
Start: 2023-04-24 | End: 2023-04-28

## 2023-04-24 RX ORDER — OXYCODONE HYDROCHLORIDE 5 MG/1
10 TABLET ORAL EVERY 4 HOURS
Refills: 0 | Status: DISCONTINUED | OUTPATIENT
Start: 2023-04-24 | End: 2023-04-28

## 2023-04-24 RX ORDER — TRAMADOL HYDROCHLORIDE 50 MG/1
50 TABLET ORAL EVERY 6 HOURS
Refills: 0 | Status: DISCONTINUED | OUTPATIENT
Start: 2023-04-24 | End: 2023-04-25

## 2023-04-24 RX ORDER — HYDROMORPHONE HYDROCHLORIDE 2 MG/ML
0.5 INJECTION INTRAMUSCULAR; INTRAVENOUS; SUBCUTANEOUS EVERY 4 HOURS
Refills: 0 | Status: DISCONTINUED | OUTPATIENT
Start: 2023-04-24 | End: 2023-04-24

## 2023-04-24 RX ORDER — TAMSULOSIN HYDROCHLORIDE 0.4 MG/1
0.4 CAPSULE ORAL AT BEDTIME
Refills: 0 | Status: DISCONTINUED | OUTPATIENT
Start: 2023-04-24 | End: 2023-04-28

## 2023-04-24 RX ORDER — OXYCODONE HYDROCHLORIDE 5 MG/1
5 TABLET ORAL EVERY 4 HOURS
Refills: 0 | Status: DISCONTINUED | OUTPATIENT
Start: 2023-04-24 | End: 2023-04-28

## 2023-04-24 RX ORDER — MAGNESIUM HYDROXIDE 400 MG/1
30 TABLET, CHEWABLE ORAL DAILY
Refills: 0 | Status: DISCONTINUED | OUTPATIENT
Start: 2023-04-24 | End: 2023-04-28

## 2023-04-24 RX ORDER — SENNA PLUS 8.6 MG/1
2 TABLET ORAL AT BEDTIME
Refills: 0 | Status: DISCONTINUED | OUTPATIENT
Start: 2023-04-24 | End: 2023-04-28

## 2023-04-24 RX ORDER — SODIUM CHLORIDE 9 MG/ML
1000 INJECTION, SOLUTION INTRAVENOUS
Refills: 0 | Status: DISCONTINUED | OUTPATIENT
Start: 2023-04-25 | End: 2023-04-28

## 2023-04-24 RX ADMIN — Medication 15 MILLIGRAM(S): at 23:48

## 2023-04-24 RX ADMIN — CHLORHEXIDINE GLUCONATE 1 APPLICATION(S): 213 SOLUTION TOPICAL at 10:10

## 2023-04-24 RX ADMIN — APREPITANT 40 MILLIGRAM(S): 80 CAPSULE ORAL at 09:36

## 2023-04-24 RX ADMIN — Medication 15 MILLIGRAM(S): at 19:06

## 2023-04-24 RX ADMIN — Medication 975 MILLIGRAM(S): at 22:41

## 2023-04-24 RX ADMIN — Medication 15 MILLIGRAM(S): at 19:21

## 2023-04-24 RX ADMIN — Medication 1000 MILLIGRAM(S): at 09:35

## 2023-04-24 RX ADMIN — ATORVASTATIN CALCIUM 20 MILLIGRAM(S): 80 TABLET, FILM COATED ORAL at 22:41

## 2023-04-24 RX ADMIN — CELECOXIB 400 MILLIGRAM(S): 200 CAPSULE ORAL at 09:35

## 2023-04-24 RX ADMIN — Medication 975 MILLIGRAM(S): at 23:41

## 2023-04-24 RX ADMIN — TAMSULOSIN HYDROCHLORIDE 0.4 MILLIGRAM(S): 0.4 CAPSULE ORAL at 22:42

## 2023-04-24 RX ADMIN — SENNA PLUS 2 TABLET(S): 8.6 TABLET ORAL at 22:42

## 2023-04-24 RX ADMIN — POLYETHYLENE GLYCOL 3350 17 GRAM(S): 17 POWDER, FOR SOLUTION ORAL at 22:42

## 2023-04-24 RX ADMIN — Medication 1 APPLICATION(S): at 10:10

## 2023-04-24 RX ADMIN — Medication 100 MILLIGRAM(S): at 21:03

## 2023-04-24 NOTE — PRE-ANESTHESIA EVALUATION ADULT - NSANTHADDINFOFT_GEN_ALL_CORE
R/B/A d/w patient including risks of parasthesia, HA, and nerve injury. All questions answered. Spinal/possible GA/ PNB for postop pain  R/B/A d/w patient including risks of parasthesia, HA, and nerve injury. All questions answered.

## 2023-04-24 NOTE — PRE-ANESTHESIA EVALUATION ADULT - NSANTHOSAYNRD_GEN_A_CORE
No. DEBI screening performed.  STOP BANG Legend: 0-2 = LOW Risk; 3-4 = INTERMEDIATE Risk; 5-8 = HIGH Risk

## 2023-04-25 ENCOUNTER — TRANSCRIPTION ENCOUNTER (OUTPATIENT)
Age: 68
End: 2023-04-25

## 2023-04-25 LAB
ALBUMIN SERPL ELPH-MCNC: 4.3 G/DL
ALP BLD-CCNC: 78 U/L
ALT SERPL-CCNC: 24 U/L
ANION GAP SERPL CALC-SCNC: 13 MMOL/L
ANION GAP SERPL CALC-SCNC: 7 MMOL/L — SIGNIFICANT CHANGE UP (ref 5–17)
APTT BLD: 31.4 SEC
AST SERPL-CCNC: 22 U/L
BASOPHILS # BLD AUTO: 0.06 K/UL
BASOPHILS NFR BLD AUTO: 0.7 %
BILIRUB SERPL-MCNC: 0.6 MG/DL
BUN SERPL-MCNC: 15 MG/DL
BUN SERPL-MCNC: 16 MG/DL — SIGNIFICANT CHANGE UP (ref 7–23)
CALCIUM SERPL-MCNC: 8.6 MG/DL — SIGNIFICANT CHANGE UP (ref 8.4–10.5)
CALCIUM SERPL-MCNC: 9.5 MG/DL
CHLORIDE SERPL-SCNC: 102 MMOL/L
CHLORIDE SERPL-SCNC: 103 MMOL/L — SIGNIFICANT CHANGE UP (ref 96–108)
CO2 SERPL-SCNC: 25 MMOL/L
CO2 SERPL-SCNC: 27 MMOL/L — SIGNIFICANT CHANGE UP (ref 22–31)
CREAT SERPL-MCNC: 1.04 MG/DL
CREAT SERPL-MCNC: 1.29 MG/DL — SIGNIFICANT CHANGE UP (ref 0.5–1.3)
EGFR: 60 ML/MIN/1.73M2 — SIGNIFICANT CHANGE UP
EGFR: 77 ML/MIN/1.73M2
EOSINOPHIL # BLD AUTO: 0.38 K/UL
EOSINOPHIL NFR BLD AUTO: 4.2 %
ESTIMATED AVERAGE GLUCOSE: 114 MG/DL
GLUCOSE SERPL-MCNC: 107 MG/DL — HIGH (ref 70–99)
GLUCOSE SERPL-MCNC: 84 MG/DL
HBA1C MFR BLD HPLC: 5.6 %
HCT VFR BLD CALC: 41.5 % — SIGNIFICANT CHANGE UP (ref 39–50)
HCT VFR BLD CALC: 47.1 %
HCV AB S/CO SERPL IA: 0.13 S/CO — SIGNIFICANT CHANGE UP (ref 0–0.99)
HCV AB SERPL-IMP: SIGNIFICANT CHANGE UP
HGB BLD-MCNC: 14.1 G/DL — SIGNIFICANT CHANGE UP (ref 13–17)
HGB BLD-MCNC: 15.3 G/DL
IMM GRANULOCYTES NFR BLD AUTO: 0.4 %
INR PPP: 1.12 RATIO
LYMPHOCYTES # BLD AUTO: 1.49 K/UL
LYMPHOCYTES NFR BLD AUTO: 16.6 %
MAN DIFF?: NORMAL
MCHC RBC-ENTMCNC: 32.3 PG — SIGNIFICANT CHANGE UP (ref 27–34)
MCHC RBC-ENTMCNC: 32.5 GM/DL
MCHC RBC-ENTMCNC: 32.6 PG
MCHC RBC-ENTMCNC: 34 GM/DL — SIGNIFICANT CHANGE UP (ref 32–36)
MCV RBC AUTO: 100.2 FL
MCV RBC AUTO: 95 FL — SIGNIFICANT CHANGE UP (ref 80–100)
MONOCYTES # BLD AUTO: 0.81 K/UL
MONOCYTES NFR BLD AUTO: 9.1 %
NEUTROPHILS # BLD AUTO: 6.17 K/UL
NEUTROPHILS NFR BLD AUTO: 69 %
NRBC # BLD: 0 /100 WBCS — SIGNIFICANT CHANGE UP (ref 0–0)
PLATELET # BLD AUTO: 227 K/UL — SIGNIFICANT CHANGE UP (ref 150–400)
PLATELET # BLD AUTO: 255 K/UL
POTASSIUM SERPL-MCNC: 4.1 MMOL/L — SIGNIFICANT CHANGE UP (ref 3.5–5.3)
POTASSIUM SERPL-SCNC: 4.1 MMOL/L — SIGNIFICANT CHANGE UP (ref 3.5–5.3)
POTASSIUM SERPL-SCNC: 4.4 MMOL/L
PROT SERPL-MCNC: 6.2 G/DL
PT BLD: 13 SEC
RBC # BLD: 4.37 M/UL — SIGNIFICANT CHANGE UP (ref 4.2–5.8)
RBC # BLD: 4.7 M/UL
RBC # FLD: 13.5 % — SIGNIFICANT CHANGE UP (ref 10.3–14.5)
RBC # FLD: 14.7 %
SODIUM SERPL-SCNC: 137 MMOL/L — SIGNIFICANT CHANGE UP (ref 135–145)
SODIUM SERPL-SCNC: 140 MMOL/L
WBC # BLD: 12.3 K/UL — HIGH (ref 3.8–10.5)
WBC # FLD AUTO: 12.3 K/UL — HIGH (ref 3.8–10.5)
WBC # FLD AUTO: 8.95 K/UL

## 2023-04-25 PROCEDURE — 99221 1ST HOSP IP/OBS SF/LOW 40: CPT

## 2023-04-25 RX ORDER — CELECOXIB 200 MG/1
1 CAPSULE ORAL
Qty: 0 | Refills: 0 | DISCHARGE
Start: 2023-04-25

## 2023-04-25 RX ORDER — CELECOXIB 200 MG/1
100 CAPSULE ORAL
Refills: 0 | Status: DISCONTINUED | OUTPATIENT
Start: 2023-04-25 | End: 2023-04-28

## 2023-04-25 RX ORDER — PANTOPRAZOLE SODIUM 20 MG/1
1 TABLET, DELAYED RELEASE ORAL
Qty: 0 | Refills: 0 | DISCHARGE
Start: 2023-04-25

## 2023-04-25 RX ORDER — OXYCODONE HYDROCHLORIDE 5 MG/1
1 TABLET ORAL
Qty: 0 | Refills: 0 | DISCHARGE
Start: 2023-04-25

## 2023-04-25 RX ORDER — SENNA PLUS 8.6 MG/1
2 TABLET ORAL
Qty: 0 | Refills: 0 | DISCHARGE
Start: 2023-04-25

## 2023-04-25 RX ORDER — ASPIRIN/CALCIUM CARB/MAGNESIUM 324 MG
1 TABLET ORAL
Qty: 0 | Refills: 0 | DISCHARGE
Start: 2023-04-25

## 2023-04-25 RX ORDER — ACETAMINOPHEN 500 MG
2 TABLET ORAL
Qty: 0 | Refills: 0 | DISCHARGE
Start: 2023-04-25

## 2023-04-25 RX ORDER — POLYETHYLENE GLYCOL 3350 17 G/17G
17 POWDER, FOR SOLUTION ORAL
Qty: 0 | Refills: 0 | DISCHARGE
Start: 2023-04-25

## 2023-04-25 RX ADMIN — Medication 15 MILLIGRAM(S): at 05:32

## 2023-04-25 RX ADMIN — SENNA PLUS 2 TABLET(S): 8.6 TABLET ORAL at 21:16

## 2023-04-25 RX ADMIN — OXYCODONE HYDROCHLORIDE 5 MILLIGRAM(S): 5 TABLET ORAL at 12:31

## 2023-04-25 RX ADMIN — CELECOXIB 100 MILLIGRAM(S): 200 CAPSULE ORAL at 17:09

## 2023-04-25 RX ADMIN — Medication 81 MILLIGRAM(S): at 05:17

## 2023-04-25 RX ADMIN — Medication 15 MILLIGRAM(S): at 11:55

## 2023-04-25 RX ADMIN — Medication 15 MILLIGRAM(S): at 05:17

## 2023-04-25 RX ADMIN — TAMSULOSIN HYDROCHLORIDE 0.4 MILLIGRAM(S): 0.4 CAPSULE ORAL at 21:15

## 2023-04-25 RX ADMIN — Medication 1 TABLET(S): at 11:55

## 2023-04-25 RX ADMIN — Medication 975 MILLIGRAM(S): at 05:17

## 2023-04-25 RX ADMIN — Medication 81 MILLIGRAM(S): at 17:08

## 2023-04-25 RX ADMIN — Medication 975 MILLIGRAM(S): at 22:16

## 2023-04-25 RX ADMIN — Medication 975 MILLIGRAM(S): at 06:17

## 2023-04-25 RX ADMIN — Medication 100 MILLIGRAM(S): at 05:13

## 2023-04-25 RX ADMIN — PANTOPRAZOLE SODIUM 40 MILLIGRAM(S): 20 TABLET, DELAYED RELEASE ORAL at 05:17

## 2023-04-25 RX ADMIN — POLYETHYLENE GLYCOL 3350 17 GRAM(S): 17 POWDER, FOR SOLUTION ORAL at 21:15

## 2023-04-25 RX ADMIN — ATORVASTATIN CALCIUM 20 MILLIGRAM(S): 80 TABLET, FILM COATED ORAL at 21:15

## 2023-04-25 RX ADMIN — OXYCODONE HYDROCHLORIDE 5 MILLIGRAM(S): 5 TABLET ORAL at 11:55

## 2023-04-25 RX ADMIN — Medication 975 MILLIGRAM(S): at 13:40

## 2023-04-25 RX ADMIN — Medication 975 MILLIGRAM(S): at 21:16

## 2023-04-25 RX ADMIN — Medication 15 MILLIGRAM(S): at 00:03

## 2023-04-25 NOTE — DISCHARGE NOTE PROVIDER - CARE PROVIDER_API CALL
Dave Garg)  Orthopaedic Surgery  [address] [facility], [place] [address]  Phone: [phone]  Fax: [phone]  Follow Up Time: 2 weeks

## 2023-04-25 NOTE — PHYSICAL THERAPY INITIAL EVALUATION ADULT - GENERAL OBSERVATIONS, REHAB EVAL
Spoke with KEITH Chris who cleared for OOB. Patient received semi-supine in NAD with +heplock +L knee dressing clean/dry/intact +cryocuff +SCD x 2

## 2023-04-25 NOTE — CONSULT NOTE ADULT - ASSESSMENT
68M w h/o CVA, DVT s/p IVC filter, HLD, BPH, p/w chronic L knee pain here for elective L TKR w Dr. Garg 4/24    #Post-op state. PPx: ASA 81 bidi per ortho. Encourage incentive spirometer .On Bowel regimen   - celebrex 200 BID, Tylenol 976 q8h, Toradol 15 q6h IV x4 doses.    - PRN: oxycodone 5/10 q4h PRN   - PPx: PPI 40 daily  #Leukocytosis - 12. Baseline __  #HLD - Atova 20  #BPH - Flomax 0.4      RECOMMEND  f/u PT eval    DISPO:   INCOMPLETE PCP: Dr. Guzman Prado  68M w h/o hemorrhagic stroke s/p AVM coiling in 2011, Prostate CA s/p radiation seeds, DVT s/p IVC filter, HLD, BPH, p/w chronic L knee pain here for elective L TKR w Dr. Garg 4/24    #Post-op state. PPx: ASA 81 bid per ortho. Encourage incentive spirometer .On Bowel regimen   - celebrex 200 BID, Tylenol 976 q8h, Toradol 15 q6h IV x4 doses.    - PRN: oxycodone 5/10 q4h PRN   - PPx: PPI 40 daily    #L knee OA - mgmt by orthopedics  #Leukocytosis - 12. Likely reactive. Nontoxic appearing. Monitor clinically.  #HLD - Atova 20  #Prostate CA s/p Radiation beads in 2007 w BPH - Flomax 0.4  #H/o DVT s/p IVC filter.     RECOMMEND  f/u PT eval    DISPO: pending PT eval

## 2023-04-25 NOTE — DISCHARGE NOTE PROVIDER - NSDCACTIVITY_GEN_ALL_CORE
Do not drive or operate machinery/Stairs allowed/Walking - Indoors allowed/No heavy lifting/straining/Follow Instructions Provided by your Surgical Team

## 2023-04-25 NOTE — PATIENT PROFILE ADULT - NSPRESCRUSEDDRG_GEN_A_NUR
Sandrine Abreu is a 5 year old female presenting with constipation. She has a stomachache. This started this morning after eating breakfast. She tells mom that her stomach hurts multiple times per day. She has been sleeping on an incline, because it is uncomfortable for her to sleep flat. She did sleep flat last night with very little pain.     Last stool was last night. It wasn't hard, and it looked like a snake.   Last time she urinated was this morning around 6am. No pain with urination.       Pt has already gone for Xray.    Denies known Latex allergy or symptoms of Latex sensitivity.  Medications verified, no changes.         No

## 2023-04-25 NOTE — PHYSICAL THERAPY INITIAL EVALUATION ADULT - GAIT DEVIATIONS NOTED, PT EVAL
patient with fairly steady gait, no LOB, required verbal cues for upright posture and safety awareness/decreased [place]/decreased step length

## 2023-04-25 NOTE — PHYSICAL THERAPY INITIAL EVALUATION ADULT - PERTINENT HX OF CURRENT PROBLEM, REHAB EVAL
Patient is a 68 year old male with left knee pain x over 1 year without accident or injury to bring on pain. Pain is tolerate when sitting and lying but worse with walking. Pt cannot run or jump due to his pain. His pain persists despite conservative measures including physical therapy. He ambulates with no assistance.

## 2023-04-25 NOTE — PATIENT PROFILE ADULT - FALL HARM RISK - UNIVERSAL INTERVENTIONS
Bed in lowest position, wheels locked, appropriate side rails in place/Call bell, personal items and telephone in reach/Instruct patient to call for assistance before getting out of bed or chair/Non-slip footwear when patient is out of bed/[place] to call system/Physically safe environment - no spills, clutter or unnecessary equipment/Purposeful Proactive Rounding/Room/bathroom lighting operational, light cord in reach

## 2023-04-25 NOTE — PHYSICAL THERAPY INITIAL EVALUATION ADULT - ADDITIONAL COMMENTS
Patient reports he lives alone in 2 floor apartment with 12 steps to bedrooms. PTA patient was independent with all mobility and ADLs. Patient owns multiple canes.
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
## 2023-04-25 NOTE — DISCHARGE NOTE PROVIDER - HOSPITAL COURSE
Admitted 4/24/23  Surgery- left total knee replacement  Rukhsana-op Antibiotics  Pain control  DVT prophylaxis  OOB/Physical Therapy    Inpatient Events:  Post-op urinary retention- required 1x straight catheterization; resolved

## 2023-04-25 NOTE — DISCHARGE NOTE PROVIDER - NSDCFUADDINST_GEN_ALL_CORE_FT
*****Please follow Dr. Garg’s instruction sheets. *****         ACTIVITY:     - Weight bear as tolerated with assistive device. No strenuous activity, heavy lifting, driving or returning to work until cleared by MD.     - Apply a cold compress to the surgical site several times daily to reduce pain and swelling. For icing, twenty-minute sessions followed by an hour off is recommended. You should ice as frequently as possible. Ice should NEVER be placed directly on the skin. Wearing compression stockings during the first week after surgery can help reduce swelling in your knee, calf and foot, but is not required.      (KNEE REPLACEMENTS)     DO place a pillow under your heel when elevating the leg, to encourage full extension of the knee. Do NOT place a pillow behind your knee for comfort, as this can lead to permanent difficulty straightening your leg. It is normal to develop some swelling in the leg, ankle, and foot because of gravity.             DRESSING/SHOWERING:     (AQUACEL – brown gel dressing)     - You may shower, your dressing is water-resistant. Do not soak in bathtubs. Remove dressing after postop day 7, then leave incision open to air. You may do this yourself (simply peel it off), or you may ask for assistance from your visiting nurse. Keep your incision clean and dry. Do not pick at your incision. Do not apply creams, ointments or oils to your incision until cleared by your surgeon. Do not soak your incision in sitting water (ie tubs, pools, lakes, etc.) until cleared by your surgeon. Do not scrub the incision – instead, allow soap and water to flow over the incision and then pat it dry with a clean towel.       MEDICATION/ANTICOAGULATION:     -You have been prescribed aspirin, as a preventative to help prevent postoperative blood clots. Please take this medication as prescribed.      - You have been prescribed medications for pain:       - Tylenol for mild to moderate pain. Do not exceed 3,000mg daily.       - For more severe pain, take Tylenol with the addition of narcotic pain medication. Take this medication as prescribed. This medication may cause drowsiness or dizziness. Do not operate machinery. This medication may cause constipation.     - For any additional medications, follow instructions on the bottle.      -Try to have regular bowel movements. Take stool softener or laxative if necessary. You may wish to take Miralax daily until you have regular bowel movements.      - If you have been prescribed Aspirin or an anti-inflammatory, please take prilosec (omeprazole) once a day, before breakfast, until no longer taking Aspirin or anti-inflammatory. This will help protect your stomach.     - If you have a pain management physician, please follow-up with them postoperatively.      - If you experience any negative side effects of your medications, please call your surgeon's office to discuss.           FOLLOW-UP:     - Call to schedule an appt with Dr. Garg for follow up.     - Please follow-up with your primary care physician or any other specialist you see postoperatively, if needed.      - Contact your doctor or go to the emergency room if you experience: fever greater than 101.5, chills, chest pain, difficulty breathing, redness or excessive drainage around the incision, other concerns.     *****Please follow Dr. Garg’s instruction sheets. Take medications as prescribed by Dr. Garg (sent to [facility], located on the first floor of [facility])*****      ACTIVITY:     - Weight bear as tolerated with assistive device. No strenuous activity, heavy lifting, driving or returning to work until cleared by MD.     - Apply a cold compress to the surgical site several times daily to reduce pain and swelling. For icing, twenty-minute sessions followed by an hour off is recommended. You should ice as frequently as possible. Ice should NEVER be placed directly on the skin. Wearing compression stockings during the first week after surgery can help reduce swelling in your knee, calf and foot, but is not required.      (KNEE REPLACEMENTS)     DO place a pillow under your heel when elevating the leg, to encourage full extension of the knee. Do NOT place a pillow behind your knee for comfort, as this can lead to permanent difficulty straightening your leg. It is normal to develop some swelling in the leg, ankle, and foot because of gravity.             DRESSING/SHOWERING:     (AQUACEL – brown gel dressing)     - You may shower, your dressing is water-resistant. Do not soak in bathtubs. Remove dressing after postop day 7, then leave incision open to air. You may do this yourself (simply peel it off), or you may ask for assistance from your visiting nurse. Keep your incision clean and dry. Do not pick at your incision. Do not apply creams, ointments or oils to your incision until cleared by your surgeon. Do not soak your incision in sitting water (ie tubs, pools, lakes, etc.) until cleared by your surgeon. Do not scrub the incision – instead, allow soap and water to flow over the incision and then pat it dry with a clean towel.       MEDICATION/ANTICOAGULATION:     -You have been prescribed aspirin, as a preventative to help prevent postoperative blood clots. Please take this medication as prescribed.      - You have been prescribed medications for pain:       - Tylenol for mild to moderate pain. Do not exceed 3,000mg daily.       - For more severe pain, take Tylenol with the addition of narcotic pain medication. Take this medication as prescribed. This medication may cause drowsiness or dizziness. Do not operate machinery. This medication may cause constipation.     - For any additional medications, follow instructions on the bottle.      -Try to have regular bowel movements. Take stool softener or laxative if necessary. You may wish to take Miralax daily until you have regular bowel movements.      - If you have been prescribed Aspirin or an anti-inflammatory, please take prilosec (omeprazole) once a day, before breakfast, until no longer taking Aspirin or anti-inflammatory. This will help protect your stomach.     - If you have a pain management physician, please follow-up with them postoperatively.      - If you experience any negative side effects of your medications, please call your surgeon's office to discuss.           FOLLOW-UP:     - Call to schedule an appt with Dr. Garg for follow up.     - Please follow-up with your primary care physician or any other specialist you see postoperatively, if needed.      - Contact your doctor or go to the emergency room if you experience: fever greater than 101.5, chills, chest pain, difficulty breathing, redness or excessive drainage around the incision, other concerns.

## 2023-04-25 NOTE — DISCHARGE NOTE PROVIDER - NSDCFUSCHEDAPPT_GEN_ALL_CORE_FT
Dave Garg  [facility] Physician [facility]  ORTHOSURG [address]  Scheduled Appointment: 05/09/2023

## 2023-04-26 LAB
ANION GAP SERPL CALC-SCNC: 7 MMOL/L — SIGNIFICANT CHANGE UP (ref 5–17)
BUN SERPL-MCNC: 17 MG/DL — SIGNIFICANT CHANGE UP (ref 7–23)
CALCIUM SERPL-MCNC: 8.8 MG/DL — SIGNIFICANT CHANGE UP (ref 8.4–10.5)
CHLORIDE SERPL-SCNC: 102 MMOL/L — SIGNIFICANT CHANGE UP (ref 96–108)
CO2 SERPL-SCNC: 26 MMOL/L — SIGNIFICANT CHANGE UP (ref 22–31)
CREAT SERPL-MCNC: 0.97 MG/DL — SIGNIFICANT CHANGE UP (ref 0.5–1.3)
EGFR: 84 ML/MIN/1.73M2 — SIGNIFICANT CHANGE UP
GLUCOSE SERPL-MCNC: 100 MG/DL — HIGH (ref 70–99)
HCT VFR BLD CALC: 39.7 % — SIGNIFICANT CHANGE UP (ref 39–50)
HGB BLD-MCNC: 13.9 G/DL — SIGNIFICANT CHANGE UP (ref 13–17)
MCHC RBC-ENTMCNC: 32.8 PG — SIGNIFICANT CHANGE UP (ref 27–34)
MCHC RBC-ENTMCNC: 35 GM/DL — SIGNIFICANT CHANGE UP (ref 32–36)
MCV RBC AUTO: 93.6 FL — SIGNIFICANT CHANGE UP (ref 80–100)
NRBC # BLD: 0 /100 WBCS — SIGNIFICANT CHANGE UP (ref 0–0)
PLATELET # BLD AUTO: 219 K/UL — SIGNIFICANT CHANGE UP (ref 150–400)
POTASSIUM SERPL-MCNC: 4.1 MMOL/L — SIGNIFICANT CHANGE UP (ref 3.5–5.3)
POTASSIUM SERPL-SCNC: 4.1 MMOL/L — SIGNIFICANT CHANGE UP (ref 3.5–5.3)
RBC # BLD: 4.24 M/UL — SIGNIFICANT CHANGE UP (ref 4.2–5.8)
RBC # FLD: 13.7 % — SIGNIFICANT CHANGE UP (ref 10.3–14.5)
SODIUM SERPL-SCNC: 135 MMOL/L — SIGNIFICANT CHANGE UP (ref 135–145)
WBC # BLD: 13.5 K/UL — HIGH (ref 3.8–10.5)
WBC # FLD AUTO: 13.5 K/UL — HIGH (ref 3.8–10.5)

## 2023-04-26 PROCEDURE — 99232 SBSQ HOSP IP/OBS MODERATE 35: CPT

## 2023-04-26 RX ADMIN — Medication 975 MILLIGRAM(S): at 06:36

## 2023-04-26 RX ADMIN — PANTOPRAZOLE SODIUM 40 MILLIGRAM(S): 20 TABLET, DELAYED RELEASE ORAL at 05:36

## 2023-04-26 RX ADMIN — CELECOXIB 100 MILLIGRAM(S): 200 CAPSULE ORAL at 06:36

## 2023-04-26 RX ADMIN — Medication 975 MILLIGRAM(S): at 05:36

## 2023-04-26 RX ADMIN — Medication 81 MILLIGRAM(S): at 17:05

## 2023-04-26 RX ADMIN — OXYCODONE HYDROCHLORIDE 5 MILLIGRAM(S): 5 TABLET ORAL at 12:49

## 2023-04-26 RX ADMIN — TAMSULOSIN HYDROCHLORIDE 0.4 MILLIGRAM(S): 0.4 CAPSULE ORAL at 23:37

## 2023-04-26 RX ADMIN — Medication 975 MILLIGRAM(S): at 23:30

## 2023-04-26 RX ADMIN — Medication 975 MILLIGRAM(S): at 13:27

## 2023-04-26 RX ADMIN — CELECOXIB 100 MILLIGRAM(S): 200 CAPSULE ORAL at 17:05

## 2023-04-26 RX ADMIN — Medication 975 MILLIGRAM(S): at 22:37

## 2023-04-26 RX ADMIN — CELECOXIB 100 MILLIGRAM(S): 200 CAPSULE ORAL at 05:36

## 2023-04-26 RX ADMIN — Medication 1 TABLET(S): at 13:27

## 2023-04-26 RX ADMIN — Medication 81 MILLIGRAM(S): at 05:36

## 2023-04-26 RX ADMIN — ATORVASTATIN CALCIUM 20 MILLIGRAM(S): 80 TABLET, FILM COATED ORAL at 23:38

## 2023-04-26 RX ADMIN — OXYCODONE HYDROCHLORIDE 5 MILLIGRAM(S): 5 TABLET ORAL at 13:31

## 2023-04-27 LAB
ANION GAP SERPL CALC-SCNC: 9 MMOL/L — SIGNIFICANT CHANGE UP (ref 5–17)
BASOPHILS # BLD AUTO: 0.05 K/UL — SIGNIFICANT CHANGE UP (ref 0–0.2)
BASOPHILS NFR BLD AUTO: 0.4 % — SIGNIFICANT CHANGE UP (ref 0–2)
BUN SERPL-MCNC: 14 MG/DL — SIGNIFICANT CHANGE UP (ref 7–23)
CALCIUM SERPL-MCNC: 8.5 MG/DL — SIGNIFICANT CHANGE UP (ref 8.4–10.5)
CHLORIDE SERPL-SCNC: 103 MMOL/L — SIGNIFICANT CHANGE UP (ref 96–108)
CO2 SERPL-SCNC: 27 MMOL/L — SIGNIFICANT CHANGE UP (ref 22–31)
CREAT SERPL-MCNC: 0.99 MG/DL — SIGNIFICANT CHANGE UP (ref 0.5–1.3)
EGFR: 82 ML/MIN/1.73M2 — SIGNIFICANT CHANGE UP
EOSINOPHIL # BLD AUTO: 0.35 K/UL — SIGNIFICANT CHANGE UP (ref 0–0.5)
EOSINOPHIL NFR BLD AUTO: 2.7 % — SIGNIFICANT CHANGE UP (ref 0–6)
GLUCOSE SERPL-MCNC: 110 MG/DL — HIGH (ref 70–99)
HCT VFR BLD CALC: 38.3 % — LOW (ref 39–50)
HGB BLD-MCNC: 13.4 G/DL — SIGNIFICANT CHANGE UP (ref 13–17)
IMM GRANULOCYTES NFR BLD AUTO: 0.5 % — SIGNIFICANT CHANGE UP (ref 0–0.9)
LYMPHOCYTES # BLD AUTO: 1.14 K/UL — SIGNIFICANT CHANGE UP (ref 1–3.3)
LYMPHOCYTES # BLD AUTO: 8.8 % — LOW (ref 13–44)
MCHC RBC-ENTMCNC: 32.8 PG — SIGNIFICANT CHANGE UP (ref 27–34)
MCHC RBC-ENTMCNC: 35 GM/DL — SIGNIFICANT CHANGE UP (ref 32–36)
MCV RBC AUTO: 93.6 FL — SIGNIFICANT CHANGE UP (ref 80–100)
MONOCYTES # BLD AUTO: 1.23 K/UL — HIGH (ref 0–0.9)
MONOCYTES NFR BLD AUTO: 9.4 % — SIGNIFICANT CHANGE UP (ref 2–14)
NEUTROPHILS # BLD AUTO: 10.18 K/UL — HIGH (ref 1.8–7.4)
NEUTROPHILS NFR BLD AUTO: 78.2 % — HIGH (ref 43–77)
NRBC # BLD: 0 /100 WBCS — SIGNIFICANT CHANGE UP (ref 0–0)
PLATELET # BLD AUTO: 205 K/UL — SIGNIFICANT CHANGE UP (ref 150–400)
POTASSIUM SERPL-MCNC: 4.2 MMOL/L — SIGNIFICANT CHANGE UP (ref 3.5–5.3)
POTASSIUM SERPL-SCNC: 4.2 MMOL/L — SIGNIFICANT CHANGE UP (ref 3.5–5.3)
RBC # BLD: 4.09 M/UL — LOW (ref 4.2–5.8)
RBC # FLD: 14.1 % — SIGNIFICANT CHANGE UP (ref 10.3–14.5)
SODIUM SERPL-SCNC: 139 MMOL/L — SIGNIFICANT CHANGE UP (ref 135–145)
WBC # BLD: 12.94 K/UL — HIGH (ref 3.8–10.5)
WBC # FLD AUTO: 12.94 K/UL — HIGH (ref 3.8–10.5)

## 2023-04-27 PROCEDURE — 99232 SBSQ HOSP IP/OBS MODERATE 35: CPT

## 2023-04-27 RX ADMIN — ATORVASTATIN CALCIUM 20 MILLIGRAM(S): 80 TABLET, FILM COATED ORAL at 22:28

## 2023-04-27 RX ADMIN — PANTOPRAZOLE SODIUM 40 MILLIGRAM(S): 20 TABLET, DELAYED RELEASE ORAL at 07:42

## 2023-04-27 RX ADMIN — TAMSULOSIN HYDROCHLORIDE 0.4 MILLIGRAM(S): 0.4 CAPSULE ORAL at 22:28

## 2023-04-27 RX ADMIN — CELECOXIB 100 MILLIGRAM(S): 200 CAPSULE ORAL at 05:32

## 2023-04-27 RX ADMIN — Medication 1 TABLET(S): at 14:05

## 2023-04-27 RX ADMIN — Medication 81 MILLIGRAM(S): at 17:41

## 2023-04-27 RX ADMIN — Medication 975 MILLIGRAM(S): at 23:28

## 2023-04-27 RX ADMIN — Medication 81 MILLIGRAM(S): at 05:32

## 2023-04-27 RX ADMIN — Medication 975 MILLIGRAM(S): at 06:32

## 2023-04-27 RX ADMIN — CELECOXIB 100 MILLIGRAM(S): 200 CAPSULE ORAL at 06:32

## 2023-04-27 RX ADMIN — CELECOXIB 100 MILLIGRAM(S): 200 CAPSULE ORAL at 18:28

## 2023-04-27 RX ADMIN — Medication 975 MILLIGRAM(S): at 15:53

## 2023-04-27 RX ADMIN — CELECOXIB 100 MILLIGRAM(S): 200 CAPSULE ORAL at 17:42

## 2023-04-27 RX ADMIN — Medication 975 MILLIGRAM(S): at 05:32

## 2023-04-27 RX ADMIN — Medication 975 MILLIGRAM(S): at 14:04

## 2023-04-27 RX ADMIN — Medication 975 MILLIGRAM(S): at 22:28

## 2023-04-27 NOTE — PROGRESS NOTE ADULT - ASSESSMENT
PCP: Dr. Guzman Prado  68M w h/o hemorrhagic stroke s/p AVM coiling in 2011, Prostate CA s/p radiation seeds, DVT s/p IVC filter, HLD, BPH, p/w chronic L knee pain here for elective L TKR w Dr. Garg 4/24    #Post-op state. PPx: ASA 81 bid per ortho. Encourage incentive spirometer .On Bowel regimen   - celebrex 200 BID, Tylenol 976 q8h, Toradol 15 q6h IV x4 doses.    - PRN: oxycodone 5/10 q4h PRN   - PPx: PPI 40 daily    #L knee OA - mgmt by orthopedics  #Leukocytosis - 13 from 12. Likely reactive. Nontoxic appearing. Monitor clinically.  #HLD - [place] 20  #Prostate CA s/p Radiation beads in 2007 w BPH - Flomax 0.4  #H/o DVT s/p IVC filter.     RECOMMEND  Overall doing well. Optimized for discharge from medical standpoint - pending clearing PT    DISPO: HPT vs [facility] 
PCP: Dr. Guzman Prado  68M w h/o hemorrhagic stroke s/p AVM coiling in 2011, Prostate CA s/p radiation seeds, DVT s/p IVC filter, HLD, BPH, p/w chronic L knee pain here for elective L TKR w Dr. Garg 4/24    #Post-op state. PPx: ASA 81 bid per ortho. Encourage incentive spirometer .On Bowel regimen   - celebrex 200 BID, Tylenol 976 q8h,  - PRN: oxycodone 5/10 q4h PRN   - PPx: PPI 40 daily    #L knee OA - mgmt by orthopedics  #Leukocytosis - 13 today from 13.5 from 12. Likely reactive. Nontoxic appearing. Monitor clinically. Diff shows neutrophil predominance. No fever or infection sxs.  #HLD - Atova 20  #Prostate CA s/p Radiation beads in 2007 w BPH - Flomax 0.4  #H/o DVT s/p IVC filter.     RECOMMEND  Overall doing well. Optimized for discharge from medical standpoint    DISPO: HPT vs [facility] (accepted and pending auth for [facility])

## 2023-04-28 ENCOUNTER — TRANSCRIPTION ENCOUNTER (OUTPATIENT)
Age: 68
End: 2023-04-28

## 2023-04-28 VITALS
OXYGEN SATURATION: 94 % | RESPIRATION RATE: 18 BRPM | DIASTOLIC BLOOD PRESSURE: 72 MMHG | HEART RATE: 88 BPM | TEMPERATURE: 98 F | SYSTOLIC BLOOD PRESSURE: 108 MMHG

## 2023-04-28 LAB
ANION GAP SERPL CALC-SCNC: 6 MMOL/L — SIGNIFICANT CHANGE UP (ref 5–17)
BUN SERPL-MCNC: 14 MG/DL — SIGNIFICANT CHANGE UP (ref 7–23)
CALCIUM SERPL-MCNC: 9.4 MG/DL — SIGNIFICANT CHANGE UP (ref 8.4–10.5)
CHLORIDE SERPL-SCNC: 99 MMOL/L — SIGNIFICANT CHANGE UP (ref 96–108)
CO2 SERPL-SCNC: 31 MMOL/L — SIGNIFICANT CHANGE UP (ref 22–31)
CREAT SERPL-MCNC: 1.13 MG/DL — SIGNIFICANT CHANGE UP (ref 0.5–1.3)
EGFR: 70 ML/MIN/1.73M2 — SIGNIFICANT CHANGE UP
GLUCOSE SERPL-MCNC: 94 MG/DL — SIGNIFICANT CHANGE UP (ref 70–99)
HCT VFR BLD CALC: 40.7 % — SIGNIFICANT CHANGE UP (ref 39–50)
HGB BLD-MCNC: 13.9 G/DL — SIGNIFICANT CHANGE UP (ref 13–17)
MCHC RBC-ENTMCNC: 32.4 PG — SIGNIFICANT CHANGE UP (ref 27–34)
MCHC RBC-ENTMCNC: 34.2 GM/DL — SIGNIFICANT CHANGE UP (ref 32–36)
MCV RBC AUTO: 94.9 FL — SIGNIFICANT CHANGE UP (ref 80–100)
NRBC # BLD: 0 /100 WBCS — SIGNIFICANT CHANGE UP (ref 0–0)
PLATELET # BLD AUTO: 255 K/UL — SIGNIFICANT CHANGE UP (ref 150–400)
POTASSIUM SERPL-MCNC: 3.7 MMOL/L — SIGNIFICANT CHANGE UP (ref 3.5–5.3)
POTASSIUM SERPL-SCNC: 3.7 MMOL/L — SIGNIFICANT CHANGE UP (ref 3.5–5.3)
RBC # BLD: 4.29 M/UL — SIGNIFICANT CHANGE UP (ref 4.2–5.8)
RBC # FLD: 14.3 % — SIGNIFICANT CHANGE UP (ref 10.3–14.5)
SODIUM SERPL-SCNC: 136 MMOL/L — SIGNIFICANT CHANGE UP (ref 135–145)
WBC # BLD: 10.71 K/UL — HIGH (ref 3.8–10.5)
WBC # FLD AUTO: 10.71 K/UL — HIGH (ref 3.8–10.5)

## 2023-04-28 PROCEDURE — 97530 THERAPEUTIC ACTIVITIES: CPT

## 2023-04-28 PROCEDURE — 97116 GAIT TRAINING THERAPY: CPT

## 2023-04-28 PROCEDURE — 86803 HEPATITIS C AB TEST: CPT

## 2023-04-28 PROCEDURE — 97162 PT EVAL MOD COMPLEX 30 MIN: CPT

## 2023-04-28 PROCEDURE — 36415 COLL VENOUS BLD VENIPUNCTURE: CPT

## 2023-04-28 PROCEDURE — 73560 X-RAY EXAM OF KNEE 1 OR 2: CPT

## 2023-04-28 PROCEDURE — C1889: CPT

## 2023-04-28 PROCEDURE — S2900: CPT

## 2023-04-28 PROCEDURE — 85027 COMPLETE CBC AUTOMATED: CPT

## 2023-04-28 PROCEDURE — C1713: CPT

## 2023-04-28 PROCEDURE — C1776: CPT

## 2023-04-28 PROCEDURE — 97110 THERAPEUTIC EXERCISES: CPT

## 2023-04-28 PROCEDURE — 80048 BASIC METABOLIC PNL TOTAL CA: CPT

## 2023-04-28 RX ADMIN — Medication 1 TABLET(S): at 14:17

## 2023-04-28 RX ADMIN — CELECOXIB 100 MILLIGRAM(S): 200 CAPSULE ORAL at 06:11

## 2023-04-28 RX ADMIN — Medication 975 MILLIGRAM(S): at 15:21

## 2023-04-28 RX ADMIN — OXYCODONE HYDROCHLORIDE 5 MILLIGRAM(S): 5 TABLET ORAL at 11:38

## 2023-04-28 RX ADMIN — PANTOPRAZOLE SODIUM 40 MILLIGRAM(S): 20 TABLET, DELAYED RELEASE ORAL at 05:11

## 2023-04-28 RX ADMIN — Medication 81 MILLIGRAM(S): at 18:17

## 2023-04-28 RX ADMIN — OXYCODONE HYDROCHLORIDE 5 MILLIGRAM(S): 5 TABLET ORAL at 10:38

## 2023-04-28 RX ADMIN — Medication 975 MILLIGRAM(S): at 05:11

## 2023-04-28 RX ADMIN — CELECOXIB 100 MILLIGRAM(S): 200 CAPSULE ORAL at 05:11

## 2023-04-28 RX ADMIN — Medication 975 MILLIGRAM(S): at 06:11

## 2023-04-28 RX ADMIN — Medication 975 MILLIGRAM(S): at 14:17

## 2023-04-28 RX ADMIN — Medication 81 MILLIGRAM(S): at 05:11

## 2023-04-28 RX ADMIN — CELECOXIB 100 MILLIGRAM(S): 200 CAPSULE ORAL at 18:16

## 2023-04-28 NOTE — PROGRESS NOTE ADULT - SUBJECTIVE AND OBJECTIVE BOX
INTERVAL HPI/OVERNIGHT EVENTS: taylor o/n    SUBJECTIVE: Patient seen and examined at bedside.   Pt reports knee pain improving. No LH/Dizziness while ambulating. Had BM this morning. Denies any fever, chest pain, dyspnea.    OBJECTIVE:    VITAL SIGNS:  ICU Vital Signs Last 24 Hrs  T(C): 36.7 (27 Apr 2023 08:57), Max: 36.9 (26 Apr 2023 20:43)  T(F): 98.1 (27 Apr 2023 08:57), Max: 98.5 (26 Apr 2023 20:43)  HR: 75 (27 Apr 2023 08:57) (67 - 79)  BP: 138/91 (27 Apr 2023 08:57) (112/61 - 138/91)  BP(mean): --  ABP: --  ABP(mean): --  RR: 17 (27 Apr 2023 08:57) (16 - 18)  SpO2: 99% (27 Apr 2023 08:57) (97% - 99%)    O2 Parameters below as of 27 Apr 2023 08:57  Patient On (Oxygen Delivery Method): room air              04-26 @ 07:01 - 04-27 @ 07:00  --------------------------------------------------------  IN: 320 mL / OUT: 1250 mL / NET: -930 mL    04-27 @ 07:01 - 04-27 @ 14:03  --------------------------------------------------------  IN: 240 mL / OUT: 300 mL / NET: -60 mL      CAPILLARY BLOOD GLUCOSE          PHYSICAL EXAM:  GEN: Male in NAD on RA  HEENT: NC/AT, MMM  CV: RRR, nml S1S2, no murmurs  PULM: nml effort, CTAB  ABD: Soft, non-distended, NABS, non-tender  NEURO  A/O x3,   EOMI  5/5 in BUE.   Ice brace in place. Vertical L knee dressing c/d/i. 5/5 in Plantarflex/ext b/l.   PSYCH: Appropriate    MEDICATIONS:  MEDICATIONS  (STANDING):  acetaminophen     Tablet .. 975 milliGRAM(s) Oral every 8 hours  aspirin  chewable 81 milliGRAM(s) Oral two times a day  atorvastatin 20 milliGRAM(s) Oral at bedtime  celecoxib 100 milliGRAM(s) Oral two times a day  HYDROmorphone  Injectable 0.5 milliGRAM(s) IV Push every 15 minutes  lactated ringers. 1000 milliLiter(s) (110 mL/Hr) IV Continuous <Continuous>  multivitamin 1 Tablet(s) Oral daily  pantoprazole    Tablet 40 milliGRAM(s) Oral before breakfast  polyethylene glycol 3350 17 Gram(s) Oral at bedtime  senna 2 Tablet(s) Oral at bedtime  tamsulosin 0.4 milliGRAM(s) Oral at bedtime    MEDICATIONS  (PRN):  bisacodyl Suppository 10 milliGRAM(s) Rectal once PRN Constipation  HYDROmorphone  Injectable 0.5 milliGRAM(s) IV Push every 4 hours PRN breakthrough pain floors only  magnesium hydroxide Suspension 30 milliLiter(s) Oral daily PRN Constipation  ondansetron Injectable 4 milliGRAM(s) IV Push every 6 hours PRN Nausea and/or Vomiting  oxyCODONE    IR 5 milliGRAM(s) Oral every 4 hours PRN Moderate Pain (4 - 6)  oxyCODONE    IR 10 milliGRAM(s) Oral every 4 hours PRN Severe Pain (7 - 10)      ALLERGIES:  Allergies    dust (Sneezing)  No Known Drug Allergies    Intolerances        LABS:                        13.4   12.94 )-----------( 205      ( 27 Apr 2023 05:30 )             38.3     04-27    139  |  103  |  14  ----------------------------<  110<H>  4.2   |  27  |  0.99    Ca    8.5      27 Apr 2023 05:30            RADIOLOGY & ADDITIONAL TESTS: Reviewed.
Ortho Note    Pt seen and examined. Comfortable without complaints, pain controlled  Denies CP, SOB, N/V, numbness/tingling     Vital Signs Last 24 Hrs  T(C): 36.7 (04-26-23 @ 09:33), Max: 37 (04-26-23 @ 05:33)  T(F): 98 (04-26-23 @ 09:33), Max: 98.6 (04-26-23 @ 05:33)  HR: 76 (04-26-23 @ 09:33) (76 - 82)  BP: 144/71 (04-26-23 @ 09:33) (117/71 - 144/71)  BP(mean): --  RR: 16 (04-26-23 @ 09:33) (16 - 18)  SpO2: 97% (04-26-23 @ 09:33) (97% - 97%)  AVSS      General: Pt Alert and oriented, NAD  DSG- prineo/ aquacel C/D/I  Pulses: +DP BLE  Sensation: SILT BLE  Motor: 5/5 EHL/FHL/TA/GS BLE                          13.9   13.50 )-----------( 219      ( 26 Apr 2023 06:48 )             39.7     04-26    135  |  102  |  17  ----------------------------<  100<H>  4.1   |  26  |  0.97    Ca    8.8      26 Apr 2023 06:48        A/P:  68yM s/p left total knee replacement on 4/24/23  - VSS, Labs WNL- appreciate internal medicine co-management recommendations  - Pain Control  - DVT ppx: ASA bid (has IVC filter from past DVT); BRITTANY stockings, SCDs  - PT, WBS: WBAT  - OOB for meals, I/S  - continue bowel regimen  - dispo: [facility] pending acceptance auth; to go home with HPT if denied    Ortho Pager [phone]
Ortho Note    Pt seen and examined. Comfortable without complaints, pain controlled  Denies CP, SOB, N/V, numbness/tingling.     Vital Signs Last 24 Hrs  T(C): 36.7 (04-27-23 @ 08:57), Max: 36.7 (04-27-23 @ 08:57)  T(F): 98.1 (04-27-23 @ 08:57), Max: 98.1 (04-27-23 @ 08:57)  HR: 75 (04-27-23 @ 08:57) (67 - 75)  BP: 138/91 (04-27-23 @ 08:57) (116/72 - 138/91)  BP(mean): --  RR: 17 (04-27-23 @ 08:57) (17 - 18)  SpO2: 99% (04-27-23 @ 08:57) (98% - 99%)  AVSS    General: Pt Alert and oriented, NAD  DSG- aquacel C/D/I  Pulses: +2DP, WWP feet  Sensation: SILT BLE  Motor: 5/5 EHL/FHL/TA/GS                          13.4   12.94 )-----------( 205      ( 27 Apr 2023 05:30 )             38.3     04-27    139  |  103  |  14  ----------------------------<  110<H>  4.2   |  27  |  0.99    Ca    8.5      27 Apr 2023 05:30        A/P:  68yM s/p left total knee replacement on 4/24/23  - VSS, Labs WNL- appreciate internal medicine co-management recommendations  - Pain Control  - DVT ppx: ASA bid (has IVC filter from past DVT); BRITTANY stockings, SCDs  - PT, WBS: WBAT  - OOB for meals, I/S  - continue bowel regimen  - dispo: [facility] (pending PT eval, acceptance, and insurance auth); plan for dc home with HPT if no insurance auth    Ortho Pager [phone]
Ortho Post Op Check    Procedure: L TKA  Surgeon: Oh    Pt comfortable without complaints, pain controlled.   Denies CP, SOB, N/V, numbness/tingling     Vital Signs Last 24 Hrs  T(C): 36.1 (04-24-23 @ 15:45), Max: 36.1 (04-24-23 @ 15:35)  T(F): 97 (04-24-23 @ 15:45), Max: 97 (04-24-23 @ 15:35)  HR: 66 (04-24-23 @ 19:20) (44 - 66)  BP: 147/66 (04-24-23 @ 19:20) (113/59 - 155/68)  BP(mean): 95 (04-24-23 @ 19:20) (81 - 98)  RR: 22 (04-24-23 @ 19:20) (12 - 22)  SpO2: 97% (04-24-23 @ 19:20) (96% - 99%)  I&O's Summary    24 Apr 2023 07:01  -  24 Apr 2023 20:00  --------------------------------------------------------  IN: 1700 mL / OUT: 300 mL / NET: 1400 mL        General: Pt Alert and oriented, NAD  DSG C/D/I stockinette, cryocuff  B/L LE: sensation intact, DP 2+, brisk cap refill, EHL/FHL/TA/GS 5/5      A/P: 68yMale s/p L tKA on 4/24 w Dr Garg   - Stable  - Pain Control  - DVT ppx: asa 81 bid  - voiding well postop   - Post op abx: ancef  - PT, WBS: lle wbat  - dispo: pending PT eval, patient is req Rehab placement     Ortho Pager [phone]
Procedure: L TKA  Surgeon: Oh    endorses L knee pain but able to amb w/ PT   Denies CP, SOB, N/V, numbness/tingling     Vital Signs Last 24 Hrs  T(C): 36.3 (28 Apr 2023 05:09), Max: 36.8 (27 Apr 2023 15:25)  T(F): 97.4 (28 Apr 2023 05:09), Max: 98.3 (27 Apr 2023 15:25)  HR: 74 (28 Apr 2023 05:09) (70 - 75)  BP: 113/65 (28 Apr 2023 05:09) (113/65 - 138/91)  BP(mean): --  RR: 16 (28 Apr 2023 05:09) (16 - 17)  SpO2: 96% (28 Apr 2023 05:09) (96% - 99%)    Parameters below as of 28 Apr 2023 05:09  Patient On (Oxygen Delivery Method): room air      General: Pt Alert and oriented, NAD  DSG C/D/I stockinette, cryocuff  B/L LE: sensation intact, DP 2+, brisk cap refill, EHL/FHL/TA/GS 5/5      A/P: 68yMale s/p L tKA on 4/24 w Dr Garg   - Stable  - Pain Control  - DVT ppx: asa 81 bid  - voiding well postop   - Post op abx: ancef  - PT, WBS: lle wbat  - dispo: rehab    Ortho Pager [phone]
    Procedure: L TKA  Surgeon: Oh    endorses L knee pain but able to amb w/ PT   Denies CP, SOB, N/V, numbness/tingling         General: Pt Alert and oriented, NAD  DSG C/D/I stockinette, cryocuff  B/L LE: sensation intact, DP 2+, brisk cap refill, EHL/FHL/TA/GS 5/5      A/P: 68yMale s/p L tKA on 4/24 w Dr Garg   - Stable  - Pain Control  - DVT ppx: asa 81 bid  - voiding well postop   - Post op abx: ancef  - PT, WBS: lle wbat  - dispo: rehab    Ortho Pager [phone]
    Procedure: L TKA  Surgeon: Oh    endorses L knee pain but able to amb w/ PT   Denies CP, SOB, N/V, numbness/tingling     Vital Signs Last 24 Hrs  T(C): 36.1 (04-24-23 @ 15:45), Max: 36.1 (04-24-23 @ 15:35)  T(F): 97 (04-24-23 @ 15:45), Max: 97 (04-24-23 @ 15:35)  HR: 66 (04-24-23 @ 19:20) (44 - 66)  BP: 147/66 (04-24-23 @ 19:20) (113/59 - 155/68)  BP(mean): 95 (04-24-23 @ 19:20) (81 - 98)  RR: 22 (04-24-23 @ 19:20) (12 - 22)  SpO2: 97% (04-24-23 @ 19:20) (96% - 99%)  I&O's Summary    24 Apr 2023 07:01  -  24 Apr 2023 20:00  --------------------------------------------------------  IN: 1700 mL / OUT: 300 mL / NET: 1400 mL        General: Pt Alert and oriented, NAD  DSG C/D/I stockinette, cryocuff  B/L LE: sensation intact, DP 2+, brisk cap refill, EHL/FHL/TA/GS 5/5      A/P: 68yMale s/p L tKA on 4/24 w Dr Garg   - Stable  - Pain Control  - DVT ppx: asa 81 bid  - voiding well postop   - Post op abx: ancef  - PT, WBS: lle wbat  - dispo: rehab    Ortho Pager [phone]
Ortho Note    Pt comfortable without complaints, pain controlled  Denies CP, SOB, N/V, numbness/tingling     Vital Signs Last 24 Hrs  T(C): 36.8 (04-28-23 @ 08:44), Max: 36.8 (04-28-23 @ 08:44)  T(F): 98.2 (04-28-23 @ 08:44), Max: 98.2 (04-28-23 @ 08:44)  HR: 60 (04-28-23 @ 08:44) (60 - 74)  BP: 119/71 (04-28-23 @ 08:44) (113/65 - 119/71)  BP(mean): --  RR: 17 (04-28-23 @ 08:44) (16 - 17)  SpO2: 97% (04-28-23 @ 08:44) (96% - 97%)  AVSS    General: Pt Alert and oriented, NAD  DSG- aquacel C/D/I  Pulses: +2DP, WWP feet  Sensation: SILT BLE  Motor: 5/5 EHL/FHL/TA/GS                          13.9   10.71 )-----------( 255      ( 28 Apr 2023 05:30 )             40.7     04-28    136  |  99  |  14  ----------------------------<  94  3.7   |  31  |  1.13    Ca    9.4      28 Apr 2023 05:30      A/P:  68yM s/p left total knee replacement on 4/24/23  - VSS, Labs WNL- appreciate internal medicine co-management recommendations  - Pain Control  - DVT ppx: ASA bid (has IVC filter from past DVT); BRITTANY stockings, SCDs  - PT, WBS: WBAT  - OOB for meals, I/S  - continue bowel regimen  - dispo: denied [facility], peer to peer done and still denied; plan to dc home with [facility] today, transportation to be set up by [facility] Pager [phone]
Ortho Note    Pt seen and examined. Required SC x 1 last night. Has since voided. Denies suprapubic discomfort or trouble urinating at this time. Pain moderate, but medications and ice have been helping.  Denies CP, SOB, N/V, numbness/tingling     Vital Signs Last 24 Hrs  T(C): 36.7 (04-25-23 @ 09:19), Max: 36.7 (04-25-23 @ 09:19)  T(F): 98.1 (04-25-23 @ 09:19), Max: 98.1 (04-25-23 @ 09:19)  HR: 78 (04-25-23 @ 09:19) (63 - 78)  BP: 112/70 (04-25-23 @ 09:19) (111/57 - 112/70)  BP(mean): --  RR: 16 (04-25-23 @ 09:19) (16 - 18)  SpO2: 94% (04-25-23 @ 09:19) (94% - 98%)  AVSS    General: Pt Alert and oriented, NAD  DSG- prineo/ aquacel C/D/I  Pulses: +DP BLE  Sensation: SILT BLE  Motor: 5/5 EHL/FHL/TA/GS BLE                          14.1   12.30 )-----------( 227      ( 25 Apr 2023 05:30 )             41.5     04-25    137  |  103  |  16  ----------------------------<  107<H>  4.1   |  27  |  1.29    Ca    8.6      25 Apr 2023 05:30      A/P:  68yM s/p left total knee replacement on 4/24/23  - VSS, Labs WNL- appreciate internal medicine co-management recommendations  - Pain Control  - DVT ppx: ASA bid (has IVC filter from past DVT); BRITTANY stockings, SCDs  - PT, WBS: WBAT  - OOB for meals, I/S  - continue bowel regimen  - dispo: [facility] (pending PT eval, acceptance, and insurance auth)    Ortho Pager [phone]
Ortho Post Op Check    Procedure: L TKA  Surgeon: Oh    Pt comfortable without complaints, pain controlled. Endorses some incontinence and some bladder discomfort  Denies CP, SOB, N/V, numbness/tingling     Vital Signs Last 24 Hrs  T(C): 36.1 (04-24-23 @ 15:45), Max: 36.1 (04-24-23 @ 15:35)  T(F): 97 (04-24-23 @ 15:45), Max: 97 (04-24-23 @ 15:35)  HR: 66 (04-24-23 @ 19:20) (44 - 66)  BP: 147/66 (04-24-23 @ 19:20) (113/59 - 155/68)  BP(mean): 95 (04-24-23 @ 19:20) (81 - 98)  RR: 22 (04-24-23 @ 19:20) (12 - 22)  SpO2: 97% (04-24-23 @ 19:20) (96% - 99%)  I&O's Summary    24 Apr 2023 07:01  -  24 Apr 2023 20:00  --------------------------------------------------------  IN: 1700 mL / OUT: 300 mL / NET: 1400 mL        General: Pt Alert and oriented, NAD  DSG C/D/I stockinette, cryocuff  B/L LE: sensation intact, DP 2+, brisk cap refill, EHL/FHL/TA/GS 5/5        Post-op X-Ray: hardware intact    A/P: 68yMale POD#0 s/p L tKA  - Stable  - Pain Control  - DVT ppx: asa 81 bid  -bladder scan:671cc, straight cath x1   - Post op abx: ancef  - PT, WBS: lle wbat    Ortho Pager [phone]
INTERVAL HPI/OVERNIGHT EVENTS: taylor o/n    SUBJECTIVE: Patient seen and examined at bedside.   pt reports pain and swelling present in Knee. Was able to do stairs wo LH/Dizziness, chest pain, dyspnea. Eating wo issues. Voiding.     OBJECTIVE:    VITAL SIGNS:  ICU Vital Signs Last 24 Hrs  T(C): 36.7 (26 Apr 2023 09:33), Max: 37 (26 Apr 2023 05:33)  T(F): 98 (26 Apr 2023 09:33), Max: 98.6 (26 Apr 2023 05:33)  HR: 76 (26 Apr 2023 09:33) (61 - 82)  BP: 144/71 (26 Apr 2023 09:33) (107/65 - 144/71)  BP(mean): --  ABP: --  ABP(mean): --  RR: 16 (26 Apr 2023 09:33) (16 - 18)  SpO2: 97% (26 Apr 2023 09:33) (97% - 97%)    O2 Parameters below as of 26 Apr 2023 09:33  Patient On (Oxygen Delivery Method): room air              CAPILLARY BLOOD GLUCOSE          PHYSICAL EXAM:  GEN: Male in NAD on RA  HEENT: NC/AT, MMM  CV: RRR, nml S1S2, no murmurs  PULM: nml effort, CTAB  ABD: Soft, non-distended, NABS, non-tender  NEURO  A/O x3,   EOMI  5/5 in BUE.   Ice brace in place. Vertical L knee dressing c/d/i. 5/5 in Plantarflex/ext b/l.   PSYCH: Appropriate      MEDICATIONS:  MEDICATIONS  (STANDING):  acetaminophen     Tablet .. 975 milliGRAM(s) Oral every 8 hours  aspirin  chewable 81 milliGRAM(s) Oral two times a day  atorvastatin 20 milliGRAM(s) Oral at bedtime  celecoxib 100 milliGRAM(s) Oral two times a day  HYDROmorphone  Injectable 0.5 milliGRAM(s) IV Push every 15 minutes  lactated ringers. 1000 milliLiter(s) (110 mL/Hr) IV Continuous <Continuous>  multivitamin 1 Tablet(s) Oral daily  pantoprazole    Tablet 40 milliGRAM(s) Oral before breakfast  polyethylene glycol 3350 17 Gram(s) Oral at bedtime  senna 2 Tablet(s) Oral at bedtime  tamsulosin 0.4 milliGRAM(s) Oral at bedtime    MEDICATIONS  (PRN):  bisacodyl Suppository 10 milliGRAM(s) Rectal once PRN Constipation  HYDROmorphone  Injectable 0.5 milliGRAM(s) IV Push every 4 hours PRN breakthrough pain floors only  magnesium hydroxide Suspension 30 milliLiter(s) Oral daily PRN Constipation  ondansetron Injectable 4 milliGRAM(s) IV Push every 6 hours PRN Nausea and/or Vomiting  oxyCODONE    IR 10 milliGRAM(s) Oral every 4 hours PRN Severe Pain (7 - 10)  oxyCODONE    IR 5 milliGRAM(s) Oral every 4 hours PRN Moderate Pain (4 - 6)      ALLERGIES:  Allergies    dust (Sneezing)  No Known Drug Allergies    Intolerances        LABS:                        13.9   13.50 )-----------( 219      ( 26 Apr 2023 06:48 )             39.7     04-26    135  |  102  |  17  ----------------------------<  100<H>  4.1   |  26  |  0.97    Ca    8.8      26 Apr 2023 06:48            RADIOLOGY & ADDITIONAL TESTS: Reviewed.

## 2023-04-28 NOTE — DISCHARGE NOTE NURSING/CASE MANAGEMENT/SOCIAL WORK - PATIENT PORTAL LINK FT
You can access the FollowMyHealth Patient Portal offered by [facility] by registering at the following website: [email or web address]. By joining [employer]’s FollowMyHealth portal, you will also be able to view your health information using other applications (apps) compatible with our system.

## 2023-04-28 NOTE — PROGRESS NOTE ADULT - REASON FOR ADMISSION
Left knee pain

## 2023-04-28 NOTE — DISCHARGE NOTE NURSING/CASE MANAGEMENT/SOCIAL WORK - NSDCPEFALRISK_GEN_ALL_CORE
For information on Fall & Injury Prevention, visit: [email or web address] OR  [email or web address] OR  https://www.cdc.gov/steadi/patient.html

## 2023-04-28 NOTE — PROGRESS NOTE ADULT - PROVIDER SPECIALTY LIST ADULT
Orthopedics
Hospitalist
Hospitalist
Orthopedics
Orthopedics

## 2023-05-01 ENCOUNTER — NON-APPOINTMENT (OUTPATIENT)
Age: 68
End: 2023-05-01

## 2023-05-02 DIAGNOSIS — M17.12 UNILATERAL PRIMARY OSTEOARTHRITIS, LEFT KNEE: ICD-10-CM

## 2023-05-02 DIAGNOSIS — E55.9 VITAMIN D DEFICIENCY, UNSPECIFIED: ICD-10-CM

## 2023-05-02 DIAGNOSIS — I69.342 MONOPLEGIA OF LOWER LIMB FOLLOWING CEREBRAL INFARCTION AFFECTING LEFT DOMINANT SIDE: ICD-10-CM

## 2023-05-02 DIAGNOSIS — E78.5 HYPERLIPIDEMIA, UNSPECIFIED: ICD-10-CM

## 2023-05-02 DIAGNOSIS — N40.0 BENIGN PROSTATIC HYPERPLASIA WITHOUT LOWER URINARY TRACT SYMPTOMS: ICD-10-CM

## 2023-05-09 ENCOUNTER — APPOINTMENT (OUTPATIENT)
Dept: ORTHOPEDIC SURGERY | Facility: CLINIC | Age: 68
End: 2023-05-09
Payer: MEDICARE

## 2023-05-09 VITALS
HEART RATE: 75 BPM | SYSTOLIC BLOOD PRESSURE: 155 MMHG | DIASTOLIC BLOOD PRESSURE: 81 MMHG | WEIGHT: 160 LBS | BODY MASS INDEX: 25.11 KG/M2 | OXYGEN SATURATION: 100 % | HEIGHT: 67 IN

## 2023-05-09 PROCEDURE — 99024 POSTOP FOLLOW-UP VISIT: CPT

## 2023-05-09 NOTE — HISTORY OF PRESENT ILLNESS
[de-identified] : first post op for left total knee arthroplasty , surgical date 04/24/2023 [de-identified] : 69 y/o male presenting about 2 weeks s/p left TKA. His pain has been about a 6-7/10 and he takes Tylenol, Celebrex, and oxycodone as needed at nighttime. He has been participating in home PT and it is going well. He ambulates with a cane and takes aspirin as prescribed.  [de-identified] :  Gait: antalgic left with cane.\par \par Left knee: \par - Focal soft tissue swelling: none\par - Ecchymosis: none\par - Erythema: none\par - Effusion: moderate, no Baker's cyst\par - Wounds: well healed surgical incision, benign appearing \par - Alignment: normal\par - Tenderness: TTP to medial and lateral joint line\par - ROM: 3-90\par - Collateral laxity: none\par - Cruciate laxity: none\par - Quad strength: 5/5 [de-identified] : 69 y/o male 2 weeks s/p left TKA doing well.  [de-identified] : - He was provided a referral for outpatient PT and was advised to continue with aspirin to completion, to continue with Tylenol and Celebrex, and to wean off the narcotic medication. \par - F/u in 4 weeks with left knee XR.

## 2023-05-09 NOTE — END OF VISIT
[FreeTextEntry3] : All medical record entries made by the Scribe were at my, Dr. Dave Garg, direction and personally dictated by me on 05/09/2023. I have reviewed the chart and agree that the record accurately reflects my personal performance of the history, physical exam, assessment and plan. I have also personally directed, reviewed, and agreed with the chart.

## 2023-05-09 NOTE — ADDENDUM
[FreeTextEntry1] : Documented by Vivian Hussein acting as a scribe for Dr. Dave Garg on 05/09/2023.

## 2023-06-07 ENCOUNTER — APPOINTMENT (OUTPATIENT)
Dept: ORTHOPEDIC SURGERY | Facility: CLINIC | Age: 68
End: 2023-06-07
Payer: MEDICARE

## 2023-06-07 ENCOUNTER — RESULT REVIEW (OUTPATIENT)
Age: 68
End: 2023-06-07

## 2023-06-07 ENCOUNTER — OUTPATIENT (OUTPATIENT)
Dept: OUTPATIENT SERVICES | Facility: HOSPITAL | Age: 68
LOS: 1 days | End: 2023-06-07
Payer: MEDICARE

## 2023-06-07 VITALS
SYSTOLIC BLOOD PRESSURE: 139 MMHG | HEIGHT: 67 IN | WEIGHT: 160 LBS | HEART RATE: 70 BPM | OXYGEN SATURATION: 100 % | DIASTOLIC BLOOD PRESSURE: 78 MMHG | BODY MASS INDEX: 25.11 KG/M2

## 2023-06-07 PROCEDURE — 73564 X-RAY EXAM KNEE 4 OR MORE: CPT

## 2023-06-07 PROCEDURE — 99024 POSTOP FOLLOW-UP VISIT: CPT

## 2023-06-07 PROCEDURE — 73564 X-RAY EXAM KNEE 4 OR MORE: CPT | Mod: 26,LT

## 2023-06-08 NOTE — DISCUSSION/SUMMARY
[de-identified] : 69 y/o male approximately 6 weeks s/p left TKA doing well, however, with ongoing mild arthrofibrosis. \par - I strongly encouraged him to transition at this point to outpatient PT and provided him with a new referral to do so. I encouraged him to wean the use of his cane as tolerated over time. \par - F/u in 2 months with repeat left knee XR.

## 2023-06-08 NOTE — PHYSICAL EXAM
[de-identified] : Gait: normal.  \par \par Left knee: \par - Focal soft tissue swelling: moderate\par - Ecchymosis: none\par - Erythema: none\par - Effusion: none, no Baker's cyst\par - Wounds: well healed surgical incision with mild keloid formation, benign appearing \par - Alignment: normal\par - Tenderness: TTP along the medial joint line and anterior knee. \par - ROM: 3-90\par - Collateral laxity: none\par - Cruciate laxity: none\par - Popliteal angle (degrees): 75\par - Quad strength: 5/5 [de-identified] : 4 radiographic views were taken of the left knee. These demonstrate stable position of his left TKA as compared to previous imaging without evidence of mechanical complication. Patella sits at normal height and tracks centrally.

## 2023-06-08 NOTE — PHYSICAL EXAM
[de-identified] : Gait: normal.  \par \par Left knee: \par - Focal soft tissue swelling: moderate\par - Ecchymosis: none\par - Erythema: none\par - Effusion: none, no Baker's cyst\par - Wounds: well healed surgical incision with mild keloid formation, benign appearing \par - Alignment: normal\par - Tenderness: TTP along the medial joint line and anterior knee. \par - ROM: 3-90\par - Collateral laxity: none\par - Cruciate laxity: none\par - Popliteal angle (degrees): 75\par - Quad strength: 5/5 [de-identified] : 4 radiographic views were taken of the left knee. These demonstrate stable position of his left TKA as compared to previous imaging without evidence of mechanical complication. Patella sits at normal height and tracks centrally.

## 2023-06-08 NOTE — DISCUSSION/SUMMARY
[de-identified] : 69 y/o male approximately 6 weeks s/p left TKA doing well, however, with ongoing mild arthrofibrosis. \par - I strongly encouraged him to transition at this point to outpatient PT and provided him with a new referral to do so. I encouraged him to wean the use of his cane as tolerated over time. \par - F/u in 2 months with repeat left knee XR.

## 2023-06-08 NOTE — ADDENDUM
[FreeTextEntry1] : Documented by Vivian Hussein acting as a scribe for Dr. Dave Garg on 06/07/2023.

## 2023-06-08 NOTE — HISTORY OF PRESENT ILLNESS
[de-identified] : L TKA 4/24/23\par \par 06/07/2023: 67 y/o male f/u 6 weeks s/p left TKA. Today his pain has improved at a 5/10. He takes Tylenol and Celebrex as needed. He has not been participating in outpatient PT but has been doing home PT. He ambulates with a cane out of the home and no devices inside the home. \par \par 02/03/2023: 66 y/o male presenting for f/u of left knee OA. Patient was administered a left knee aspiration and CSI during his last visit. He states this injection provided 2 days of relief. He was unable to f/u with PT due to insurance issues but today would like to move forward with left TKA and would like to plan for end of March 2023. \par \par 10/28/2022: 66 y/o male presents for evaluation of L knee OA. He reports an onset of pain around January 2022 which was atraumatic. Treatments thus far have included Aleve, a left knee aspiration and CSI by Dr. Thorne in April 2022, and PT for several months, but he notes persistent pain and loss of function. His ambulatory tolerance is limited to 2 blocks without an assistive device. He does use an assistive left knee brace. He reports minimal contralateral knee pain.

## 2023-06-08 NOTE — HISTORY OF PRESENT ILLNESS
[de-identified] : L TKA 4/24/23\par \par 06/07/2023: 67 y/o male f/u 6 weeks s/p left TKA. Today his pain has improved at a 5/10. He takes Tylenol and Celebrex as needed. He has not been participating in outpatient PT but has been doing home PT. He ambulates with a cane out of the home and no devices inside the home. \par \par 02/03/2023: 68 y/o male presenting for f/u of left knee OA. Patient was administered a left knee aspiration and CSI during his last visit. He states this injection provided 2 days of relief. He was unable to f/u with PT due to insurance issues but today would like to move forward with left TKA and would like to plan for end of March 2023. \par \par 10/28/2022: 68 y/o male presents for evaluation of L knee OA. He reports an onset of pain around January 2022 which was atraumatic. Treatments thus far have included Aleve, a left knee aspiration and CSI by Dr. Thorne in April 2022, and PT for several months, but he notes persistent pain and loss of function. His ambulatory tolerance is limited to 2 blocks without an assistive device. He does use an assistive left knee brace. He reports minimal contralateral knee pain.

## 2023-06-08 NOTE — END OF VISIT
[FreeTextEntry3] : All medical record entries made by the Scribe were at my, Dr. Dave Garg, direction and personally dictated by me on 06/07/2023. I have reviewed the chart and agree that the record accurately reflects my personal performance of the history, physical exam, assessment and plan. I have also personally directed, reviewed, and agreed with the chart.

## 2023-08-09 ENCOUNTER — RESULT REVIEW (OUTPATIENT)
Age: 68
End: 2023-08-09

## 2023-08-09 ENCOUNTER — OUTPATIENT (OUTPATIENT)
Dept: OUTPATIENT SERVICES | Facility: HOSPITAL | Age: 68
LOS: 1 days | End: 2023-08-09
Payer: MEDICARE

## 2023-08-09 ENCOUNTER — APPOINTMENT (OUTPATIENT)
Dept: ORTHOPEDIC SURGERY | Facility: CLINIC | Age: 68
End: 2023-08-09
Payer: MEDICARE

## 2023-08-09 VITALS
SYSTOLIC BLOOD PRESSURE: 133 MMHG | OXYGEN SATURATION: 97 % | BODY MASS INDEX: 25.11 KG/M2 | DIASTOLIC BLOOD PRESSURE: 69 MMHG | WEIGHT: 160 LBS | HEART RATE: 60 BPM | HEIGHT: 67 IN

## 2023-08-09 PROCEDURE — 99213 OFFICE O/P EST LOW 20 MIN: CPT

## 2023-08-09 PROCEDURE — 73564 X-RAY EXAM KNEE 4 OR MORE: CPT | Mod: 26,LT

## 2023-08-09 PROCEDURE — 73564 X-RAY EXAM KNEE 4 OR MORE: CPT

## 2023-08-10 NOTE — END OF VISIT
[FreeTextEntry3] : Documented by Audi Aguirre acting as a scribe for Dr. Dave Garg. 08/09/2023   All medical record entries made by the Scribe were at my, Dr. Dave Garg, direction and personally dictated by me on 08/09/2023. I have reviewed the chart and agree that the record accurately reflects my personal performance of the history, physical exam, assessment and plan. I have also personally directed, reviewed, and agreed with the chart.

## 2023-08-10 NOTE — HISTORY OF PRESENT ILLNESS
[de-identified] : L TKA 4/24/23 08/09/2023: 69 y/o male presenting about almost four months status post left total knee arthroplasty overall doing well. Patient states he is continuing to take Tylenol primarily in the morning. He notes some intermittent pain primarily at the medial joint line. Patient states he is continuing to participate in physical therapy twice a week.   06/07/2023: 69 y/o male f/u 6 weeks s/p left TKA. Today his pain has improved at a 5/10. He takes Tylenol and Celebrex as needed. He has not been participating in outpatient PT but has been doing home PT. He ambulates with a cane out of the home and no devices inside the home.   02/03/2023: 68 y/o male presenting for f/u of left knee OA. Patient was administered a left knee aspiration and CSI during his last visit. He states this injection provided 2 days of relief. He was unable to f/u with PT due to insurance issues but today would like to move forward with left TKA and would like to plan for end of March 2023.   10/28/2022: 68 y/o male presents for evaluation of L knee OA. He reports an onset of pain around January 2022 which was atraumatic. Treatments thus far have included Aleve, a left knee aspiration and CSI by Dr. Thorne in April 2022, and PT for several months, but he notes persistent pain and loss of function. His ambulatory tolerance is limited to 2 blocks without an assistive device. He does use an assistive left knee brace. He reports minimal contralateral knee pain.

## 2023-08-10 NOTE — PHYSICAL EXAM
[de-identified] : General appearance: well nourished and hydrated, pleasant, alert and oriented x 3, cooperative. HEENT: normocephalic, EOM intact, wearing mask, external auditory canal clear. Cardiovascular: no lower leg edema, no varicosities, dorsalis pedis pulses palpable and symmetric. Lymphatics: no palpable lymphadenopathy, no lymphedema. Neurologic: sensation is normal, no muscle weakness in upper or lower extremities, patella tendon reflexes present and symmetric. Dermatologic: skin moist, warm, no rash. Spine: cervical spine with normal lordosis and painless range of motion, thoracic spine with normal kyphosis and painless range of motion, lumbosacral spine with normal lordosis and painless range of motion.  No tenderness to palpation along midline spine and paraspinal musculature.  Sacroiliac joints nontender bilaterally. Negative SLR and crossed SLR tests bilaterally. Gait: Paitient is not using any assistive devices.   Left knee: - Focal soft tissue swelling: none - Ecchymosis: none - Erythema: none - Effusion: none w/ no palpable Baker's cyst - Wounds: Well healed surgical incision with mild keloid formation, benign appearing. - Alignment: normal - Tenderness: TTP along the medial joint like and anterior knee - ROM: 2-100 - Collateral laxity: none - Cruciate laxity: none - Meniscal signs: negative Margoth and Gareth - Popliteal angle (degrees): N/A - Quad strength: 5/5 [de-identified] : 4 radiographic views were taken of the left knee. These demonstrate stable appearance of his left total knee arthroplasty as compared to previous imaging without evidence of mechanical complication.   Patella Height: Normal Patella Tracking: Centrally

## 2023-08-10 NOTE — DISCUSSION/SUMMARY
[de-identified] : 69 y/o male approximately four months status post left total knee arthroplasty overall doing well.  - I encouraged patient to continue with physical therapy, home exercise program, to ambulate with a goal of at least 8 to 10,000 steps per day, and use Tylenol as needed for pain.  - We will folllow-up next April with repeat left knee X-rays or earlier as needed for any new or worsening symptoms.

## 2023-10-27 ENCOUNTER — APPOINTMENT (OUTPATIENT)
Dept: INTERNAL MEDICINE | Facility: CLINIC | Age: 68
End: 2023-10-27
Payer: MEDICARE

## 2023-10-27 VITALS
HEIGHT: 67 IN | SYSTOLIC BLOOD PRESSURE: 126 MMHG | HEART RATE: 68 BPM | WEIGHT: 156 LBS | TEMPERATURE: 97.3 F | OXYGEN SATURATION: 98 % | DIASTOLIC BLOOD PRESSURE: 73 MMHG | BODY MASS INDEX: 24.48 KG/M2

## 2023-10-27 DIAGNOSIS — Z00.00 ENCOUNTER FOR GENERAL ADULT MEDICAL EXAMINATION W/OUT ABNORMAL FINDINGS: ICD-10-CM

## 2023-10-27 DIAGNOSIS — M17.12 UNILATERAL PRIMARY OSTEOARTHRITIS, LEFT KNEE: ICD-10-CM

## 2023-10-27 DIAGNOSIS — R32 UNSPECIFIED URINARY INCONTINENCE: ICD-10-CM

## 2023-10-27 PROCEDURE — 36415 COLL VENOUS BLD VENIPUNCTURE: CPT

## 2023-10-27 PROCEDURE — G0439: CPT

## 2023-10-27 RX ORDER — MELOXICAM 15 MG/1
15 TABLET ORAL
Qty: 30 | Refills: 2 | Status: DISCONTINUED | COMMUNITY
Start: 2022-03-04 | End: 2023-10-27

## 2023-11-10 LAB
ALBUMIN SERPL ELPH-MCNC: 4.3 G/DL
ALP BLD-CCNC: 74 U/L
ALT SERPL-CCNC: 16 U/L
ANION GAP SERPL CALC-SCNC: 10 MMOL/L
AST SERPL-CCNC: 20 U/L
BILIRUB SERPL-MCNC: 0.8 MG/DL
BUN SERPL-MCNC: 17 MG/DL
CALCIUM SERPL-MCNC: 10 MG/DL
CHLORIDE SERPL-SCNC: 103 MMOL/L
CHOLEST SERPL-MCNC: 230 MG/DL
CO2 SERPL-SCNC: 29 MMOL/L
CREAT SERPL-MCNC: 1.13 MG/DL
DEPRECATED D DIMER PPP IA-ACNC: 317 NG/ML DDU
EGFR: 70 ML/MIN/1.73M2
GLUCOSE SERPL-MCNC: 91 MG/DL
HCT VFR BLD CALC: 42.9 %
HDLC SERPL-MCNC: 68 MG/DL
HGB BLD-MCNC: 14.4 G/DL
LDLC SERPL CALC-MCNC: 151 MG/DL
MCHC RBC-ENTMCNC: 32.7 PG
MCHC RBC-ENTMCNC: 33.6 GM/DL
MCV RBC AUTO: 97.5 FL
NONHDLC SERPL-MCNC: 162 MG/DL
PLATELET # BLD AUTO: 277 K/UL
POTASSIUM SERPL-SCNC: 4.2 MMOL/L
PROT SERPL-MCNC: 7.2 G/DL
RBC # BLD: 4.4 M/UL
RBC # FLD: 14.4 %
SODIUM SERPL-SCNC: 141 MMOL/L
TRIGL SERPL-MCNC: 65 MG/DL
WBC # FLD AUTO: 7.45 K/UL

## 2023-11-17 ENCOUNTER — APPOINTMENT (OUTPATIENT)
Dept: INTERNAL MEDICINE | Facility: CLINIC | Age: 68
End: 2023-11-17
Payer: MEDICARE

## 2023-11-17 VITALS
HEART RATE: 56 BPM | DIASTOLIC BLOOD PRESSURE: 59 MMHG | OXYGEN SATURATION: 98 % | SYSTOLIC BLOOD PRESSURE: 114 MMHG | TEMPERATURE: 97.3 F | BODY MASS INDEX: 24.48 KG/M2 | HEIGHT: 67 IN | WEIGHT: 156 LBS

## 2023-11-17 DIAGNOSIS — D17.0 BENIGN LIPOMATOUS NEOPLASM OF SKIN AND SUBCUTANEOUS TISSUE OF HEAD, FACE AND NECK: ICD-10-CM

## 2023-11-17 DIAGNOSIS — R60.0 LOCALIZED EDEMA: ICD-10-CM

## 2023-11-17 DIAGNOSIS — Z86.718 PERSONAL HISTORY OF OTHER VENOUS THROMBOSIS AND EMBOLISM: ICD-10-CM

## 2023-11-17 PROCEDURE — 99214 OFFICE O/P EST MOD 30 MIN: CPT

## 2023-11-17 RX ORDER — OXYBUTYNIN CHLORIDE 5 MG/1
5 TABLET ORAL
Qty: 90 | Refills: 0 | Status: DISCONTINUED | COMMUNITY
Start: 2017-05-05 | End: 2023-11-17

## 2023-12-05 RX ORDER — TAMSULOSIN HYDROCHLORIDE 0.4 MG/1
0.4 CAPSULE ORAL
Qty: 90 | Refills: 2 | Status: ACTIVE | COMMUNITY
Start: 2023-12-05 | End: 1900-01-01

## 2023-12-18 RX ORDER — ATORVASTATIN CALCIUM 20 MG/1
20 TABLET, FILM COATED ORAL
Qty: 90 | Refills: 3 | Status: ACTIVE | COMMUNITY
Start: 2022-03-04 | End: 1900-01-01

## 2024-04-10 ENCOUNTER — RESULT REVIEW (OUTPATIENT)
Age: 69
End: 2024-04-10

## 2024-04-10 ENCOUNTER — APPOINTMENT (OUTPATIENT)
Dept: ORTHOPEDIC SURGERY | Facility: CLINIC | Age: 69
End: 2024-04-10
Payer: MEDICARE

## 2024-04-10 ENCOUNTER — OUTPATIENT (OUTPATIENT)
Dept: OUTPATIENT SERVICES | Facility: HOSPITAL | Age: 69
LOS: 1 days | End: 2024-04-10
Payer: MEDICARE

## 2024-04-10 VITALS
WEIGHT: 156 LBS | OXYGEN SATURATION: 96 % | SYSTOLIC BLOOD PRESSURE: 117 MMHG | DIASTOLIC BLOOD PRESSURE: 67 MMHG | BODY MASS INDEX: 24.48 KG/M2 | HEIGHT: 67 IN | HEART RATE: 58 BPM

## 2024-04-10 DIAGNOSIS — Z96.652 AFTERCARE FOLLOWING JOINT REPLACEMENT SURGERY: ICD-10-CM

## 2024-04-10 DIAGNOSIS — M17.11 UNILATERAL PRIMARY OSTEOARTHRITIS, RIGHT KNEE: ICD-10-CM

## 2024-04-10 DIAGNOSIS — Z47.1 AFTERCARE FOLLOWING JOINT REPLACEMENT SURGERY: ICD-10-CM

## 2024-04-10 PROCEDURE — 99213 OFFICE O/P EST LOW 20 MIN: CPT

## 2024-04-10 PROCEDURE — 73564 X-RAY EXAM KNEE 4 OR MORE: CPT | Mod: 26,LT

## 2024-04-10 PROCEDURE — 73564 X-RAY EXAM KNEE 4 OR MORE: CPT

## 2024-04-10 NOTE — PHYSICAL EXAM
[de-identified] : General appearance: well nourished and hydrated, pleasant, alert and oriented x 3, cooperative. HEENT: normocephalic, EOM intact, wearing mask, external auditory canal clear. Cardiovascular: no lower leg edema, no varicosities, dorsalis pedis pulses palpable and symmetric. Lymphatics: no palpable lymphadenopathy, no lymphedema. Neurologic: sensation is normal, no muscle weakness in upper or lower extremities, patella tendon reflexes present and symmetric. Dermatologic: skin moist, warm, no rash. Spine: cervical spine with normal lordosis and painless range of motion, thoracic spine with normal kyphosis and painless range of motion, lumbosacral spine with normal lordosis and painless range of motion.  No tenderness to palpation along midline spine and paraspinal musculature.  Sacroiliac joints nontender bilaterally. Negative SLR and crossed SLR tests bilaterally. Gait: He does demonstrate a stable non-antalgic gait pattern without the use of any assistive device.  Left knee: - Focal soft tissue swelling: none - Ecchymosis: none - Erythema: none - Effusion: none, no palpable Baker's cyst - Wounds: well-healed midline incision, benign appearing with mild keloid formation  - Alignment: normal - Tenderness: none - ROM: 0-100 - Collateral laxity: none - Cruciate laxity: none - Popliteal angle (degrees): 35 - Quad strength: 5/5 [de-identified] : Left knee x-rays were taken today April 10, 2024 at [facility]. These demonstrate stable appearance of his left total knee arthroplasty as compared to prior imaging without evidence of mechanical complication. Patella sits at normal height and tracks centrally.

## 2024-04-10 NOTE — END OF VISIT
[FreeTextEntry3] : Documented by Saundra Hook acting a as a scribe for Dr. Dave Garg. 04/10/2024.  All medical record entries made by the Scribe were at my, Dr. Dave Garg, direction and personally dictated by me on 04/10/2024. I have reviewed the chart and agree that the record accurately reflects my personal performance of the history, physical exam, assessment and plan. I have also personally directed, reviewed, and agreed with the chart.

## 2024-04-10 NOTE — HISTORY OF PRESENT ILLNESS
[de-identified] : L TKA 4/24/23  04/10//24: 70 y/o male presenting now about 1 year status post left total knee arthroplasty, overall doing well. He does note some suprapatellar knee pain with activity, taking Tylenol as needed. He is not participating in any physical therapy or exercise program, but he is interested in getting back into that.   08/09/2023: 69 y/o male presenting about almost four months status post left total knee arthroplasty overall doing well. Patient states he is continuing to take Tylenol primarily in the morning. He notes some intermittent pain primarily at the medial joint line. Patient states he is continuing to participate in physical therapy twice a week.   06/07/2023: 69 y/o male f/u 6 weeks s/p left TKA. Today his pain has improved at a 5/10. He takes Tylenol and Celebrex as needed. He has not been participating in outpatient PT but has been doing home PT. He ambulates with a cane out of the home and no devices inside the home.   02/03/2023: 66 y/o male presenting for f/u of left knee OA. Patient was administered a left knee aspiration and CSI during his last visit. He states this injection provided 2 days of relief. He was unable to f/u with PT due to insurance issues but today would like to move forward with left TKA and would like to plan for end of March 2023.   10/28/2022: 66 y/o male presents for evaluation of L knee OA. He reports an onset of pain around January 2022 which was atraumatic. Treatments thus far have included Aleve, a left knee aspiration and CSI by Dr. Thorne in April 2022, and PT for several months, but he notes persistent pain and loss of function. His ambulatory tolerance is limited to 2 blocks without an assistive device. He does use an assistive left knee brace. He reports minimal contralateral knee pain.

## 2024-04-10 NOTE — DISCUSSION/SUMMARY
[de-identified] : Imp: 68 y/o male now 1 year status post left total knee replacement with some mild residual arthrofibrosis, but otherwise doing well.  - We provided him with a new referral to physical therapy per his request.  - He will continue to follow up annually every April with repeat left knee x-rays or earlier as needed for any new or worsening symptoms.

## 2024-04-19 ENCOUNTER — APPOINTMENT (OUTPATIENT)
Dept: INTERNAL MEDICINE | Facility: CLINIC | Age: 69
End: 2024-04-19
Payer: MEDICARE

## 2024-04-19 VITALS
HEIGHT: 67 IN | SYSTOLIC BLOOD PRESSURE: 124 MMHG | DIASTOLIC BLOOD PRESSURE: 71 MMHG | OXYGEN SATURATION: 98 % | WEIGHT: 153.38 LBS | BODY MASS INDEX: 24.07 KG/M2 | HEART RATE: 68 BPM | TEMPERATURE: 97.2 F

## 2024-04-19 DIAGNOSIS — M54.9 DORSALGIA, UNSPECIFIED: ICD-10-CM

## 2024-04-19 DIAGNOSIS — N52.9 MALE ERECTILE DYSFUNCTION, UNSPECIFIED: ICD-10-CM

## 2024-04-19 DIAGNOSIS — E78.5 HYPERLIPIDEMIA, UNSPECIFIED: ICD-10-CM

## 2024-04-19 PROCEDURE — 99214 OFFICE O/P EST MOD 30 MIN: CPT

## 2024-04-19 RX ORDER — TADALAFIL 20 MG/1
20 TABLET, FILM COATED ORAL
Qty: 6 | Refills: 5 | Status: ACTIVE | COMMUNITY
Start: 2024-04-19 | End: 1900-01-01

## 2025-01-22 ENCOUNTER — NON-APPOINTMENT (OUTPATIENT)
Age: 70
End: 2025-01-22

## 2025-01-24 ENCOUNTER — APPOINTMENT (OUTPATIENT)
Dept: INTERNAL MEDICINE | Facility: CLINIC | Age: 70
End: 2025-01-24

## 2025-05-09 ENCOUNTER — APPOINTMENT (OUTPATIENT)
Dept: ORTHOPEDIC SURGERY | Facility: CLINIC | Age: 70
End: 2025-05-09
Payer: MEDICARE

## 2025-05-09 ENCOUNTER — RESULT REVIEW (OUTPATIENT)
Age: 70
End: 2025-05-09

## 2025-05-09 ENCOUNTER — OUTPATIENT (OUTPATIENT)
Dept: OUTPATIENT SERVICES | Facility: HOSPITAL | Age: 70
LOS: 1 days | End: 2025-05-09
Payer: MEDICARE

## 2025-05-09 VITALS
BODY MASS INDEX: 24.01 KG/M2 | HEIGHT: 67 IN | WEIGHT: 153 LBS | HEART RATE: 50 BPM | OXYGEN SATURATION: 98 % | DIASTOLIC BLOOD PRESSURE: 63 MMHG | TEMPERATURE: 97.9 F | SYSTOLIC BLOOD PRESSURE: 100 MMHG

## 2025-05-09 DIAGNOSIS — Z96.652 AFTERCARE FOLLOWING JOINT REPLACEMENT SURGERY: ICD-10-CM

## 2025-05-09 DIAGNOSIS — Z47.1 AFTERCARE FOLLOWING JOINT REPLACEMENT SURGERY: ICD-10-CM

## 2025-05-09 PROCEDURE — 99213 OFFICE O/P EST LOW 20 MIN: CPT

## 2025-05-09 PROCEDURE — 73564 X-RAY EXAM KNEE 4 OR MORE: CPT | Mod: 26,LT

## 2025-05-09 PROCEDURE — 73564 X-RAY EXAM KNEE 4 OR MORE: CPT

## 2025-07-11 ENCOUNTER — APPOINTMENT (OUTPATIENT)
Dept: GASTROENTEROLOGY | Facility: CLINIC | Age: 70
End: 2025-07-11
Payer: MEDICARE

## 2025-07-11 VITALS
SYSTOLIC BLOOD PRESSURE: 112 MMHG | BODY MASS INDEX: 21.97 KG/M2 | OXYGEN SATURATION: 98 % | HEIGHT: 67 IN | TEMPERATURE: 97.3 F | DIASTOLIC BLOOD PRESSURE: 70 MMHG | HEART RATE: 58 BPM | WEIGHT: 140 LBS

## 2025-07-11 PROCEDURE — 99203 OFFICE O/P NEW LOW 30 MIN: CPT

## 2025-08-25 ENCOUNTER — APPOINTMENT (OUTPATIENT)
Dept: INTERNAL MEDICINE | Facility: CLINIC | Age: 70
End: 2025-08-25
Payer: MEDICARE

## 2025-08-25 VITALS
TEMPERATURE: 98.2 F | DIASTOLIC BLOOD PRESSURE: 66 MMHG | HEART RATE: 69 BPM | HEIGHT: 67 IN | BODY MASS INDEX: 22.35 KG/M2 | WEIGHT: 142.38 LBS | OXYGEN SATURATION: 98 % | SYSTOLIC BLOOD PRESSURE: 112 MMHG

## 2025-08-25 DIAGNOSIS — N39.46 MIXED INCONTINENCE: ICD-10-CM

## 2025-08-25 DIAGNOSIS — C61 MALIGNANT NEOPLASM OF PROSTATE: ICD-10-CM

## 2025-08-25 DIAGNOSIS — E78.5 HYPERLIPIDEMIA, UNSPECIFIED: ICD-10-CM

## 2025-08-25 DIAGNOSIS — Z00.00 ENCOUNTER FOR GENERAL ADULT MEDICAL EXAMINATION W/OUT ABNORMAL FINDINGS: ICD-10-CM

## 2025-08-25 DIAGNOSIS — R00.1 BRADYCARDIA, UNSPECIFIED: ICD-10-CM

## 2025-08-25 DIAGNOSIS — D17.0 BENIGN LIPOMATOUS NEOPLASM OF SKIN AND SUBCUTANEOUS TISSUE OF HEAD, FACE AND NECK: ICD-10-CM

## 2025-08-25 PROCEDURE — G0009: CPT

## 2025-08-25 PROCEDURE — G2211 COMPLEX E/M VISIT ADD ON: CPT

## 2025-08-25 PROCEDURE — 90677 PCV20 VACCINE IM: CPT

## 2025-08-25 PROCEDURE — 99214 OFFICE O/P EST MOD 30 MIN: CPT | Mod: 25

## 2025-08-25 RX ORDER — OXYBUTYNIN CHLORIDE 15 MG/1
15 TABLET, EXTENDED RELEASE ORAL
Refills: 0 | Status: ACTIVE | COMMUNITY
Start: 2025-08-25

## 2025-08-26 ENCOUNTER — NON-APPOINTMENT (OUTPATIENT)
Age: 70
End: 2025-08-26

## 2025-08-26 LAB
25(OH)D3 SERPL-MCNC: 16.2 NG/ML
ALBUMIN SERPL ELPH-MCNC: 4.2 G/DL
ALP BLD-CCNC: 64 U/L
ALT SERPL-CCNC: 17 U/L
ANION GAP SERPL CALC-SCNC: 16 MMOL/L
AST SERPL-CCNC: 21 U/L
BILIRUB SERPL-MCNC: 0.6 MG/DL
BUN SERPL-MCNC: 16 MG/DL
CALCIUM SERPL-MCNC: 9.3 MG/DL
CHLORIDE SERPL-SCNC: 103 MMOL/L
CHOLEST SERPL-MCNC: 200 MG/DL
CO2 SERPL-SCNC: 23 MMOL/L
CREAT SERPL-MCNC: 1.08 MG/DL
EGFRCR SERPLBLD CKD-EPI 2021: 73 ML/MIN/1.73M2
ESTIMATED AVERAGE GLUCOSE: 108 MG/DL
GLUCOSE SERPL-MCNC: 66 MG/DL
HBA1C MFR BLD HPLC: 5.4 %
HCV AB SER QL: NONREACTIVE
HCV S/CO RATIO: 0.13 S/CO
HDLC SERPL-MCNC: 64 MG/DL
LDLC SERPL-MCNC: 127 MG/DL
NONHDLC SERPL-MCNC: 136 MG/DL
POTASSIUM SERPL-SCNC: 4.1 MMOL/L
PROT SERPL-MCNC: 6.6 G/DL
PSA SERPL-MCNC: 0.07 NG/ML
SODIUM SERPL-SCNC: 141 MMOL/L
TRIGL SERPL-MCNC: 45 MG/DL
TSH SERPL-ACNC: 1.03 UIU/ML

## (undated) DEVICE — ROSA NAVITRACKER KIT KNEE/SPINE

## (undated) DEVICE — ELCTR AQUAMANTYS BIPOLAR SEALER 6.0

## (undated) DEVICE — NDL HYPO SAFE 22G X 1.5" (BLACK)

## (undated) DEVICE — SYR ASEPTO

## (undated) DEVICE — GLV 7.5 PROTEXIS (WHITE)

## (undated) DEVICE — ROSA DRAPE ROBOTIC UNIT

## (undated) DEVICE — GLV 7 PROTEXIS (WHITE)

## (undated) DEVICE — WARMING BLANKET UPPER ADULT

## (undated) DEVICE — PACK TOTAL KNEE

## (undated) DEVICE — STRYKER HIGH FLOW TIP

## (undated) DEVICE — SUT VICRYL 0 36" CT-1 UNDYED

## (undated) DEVICE — DRAPE TOWEL BLUE 17" X 24"

## (undated) DEVICE — SUT VICRYL 2-0 27" CT-1 UNDYED

## (undated) DEVICE — SAW BLADE STRYKER SAGITTAL 81.5X12.5X1.19MM

## (undated) DEVICE — SAW BLADE STRYKER SAGITTAL DUAL CUT TEETH 35X64X.64MM

## (undated) DEVICE — HOOD T5 PEELAWAY

## (undated) DEVICE — BLADE SCALPEL SAFETYLOCK #15

## (undated) DEVICE — SURGIPHOR STERILE WOUND IRR POVIDONE IODINE

## (undated) DEVICE — DRSG DERMABOND PRINEO 22CM

## (undated) DEVICE — SUT ETHILON 3-0 18" PS-1

## (undated) DEVICE — POLISHER OR CAUTERY TIP

## (undated) DEVICE — ELCTR STRYKER NEPTUNE SMOKE EVACUATION PENCIL (GREEN)

## (undated) DEVICE — SUT STRATAFIX SPIRAL MONOCRYL PLUS 3-0 30CM PS-1 UNDYED

## (undated) DEVICE — SYR LUER LOK 50CC

## (undated) DEVICE — SPECIMEN CONTAINER 4OZ

## (undated) DEVICE — SAW BLADE STRYKER SAGITTAL 25X86.5X1.32MM

## (undated) DEVICE — VENODYNE/SCD SLEEVE CALF MEDIUM

## (undated) DEVICE — GLV 7 PROTEXIS ORTHO (CREAM)

## (undated) DEVICE — DRAPE 1/2 SHEET 40X57"

## (undated) DEVICE — POSITIONER FOAM EGG CRATE ULNAR 2PCS (PINK)

## (undated) DEVICE — DRSG AQUACEL 3.5 X 10"

## (undated) DEVICE — DRSG COBAN 6"

## (undated) DEVICE — GLV 8 PROTEXIS (WHITE)

## (undated) DEVICE — PREP CHLORAPREP HI-LITE ORANGE 26ML

## (undated) DEVICE — GLV 7.5 PROTEXIS ORTHO (CREAM)

## (undated) DEVICE — DRAPE 3/4 SHEET 52X76"